# Patient Record
Sex: FEMALE | Race: BLACK OR AFRICAN AMERICAN | NOT HISPANIC OR LATINO | Employment: UNEMPLOYED | URBAN - METROPOLITAN AREA
[De-identification: names, ages, dates, MRNs, and addresses within clinical notes are randomized per-mention and may not be internally consistent; named-entity substitution may affect disease eponyms.]

---

## 2017-03-01 RX ORDER — LISINOPRIL 20 MG/1
TABLET ORAL
Qty: 30 TABLET | Refills: 0 | Status: SHIPPED | OUTPATIENT
Start: 2017-03-01 | End: 2017-05-06 | Stop reason: SDUPTHER

## 2017-05-08 RX ORDER — LISINOPRIL 20 MG/1
TABLET ORAL
Qty: 30 TABLET | Refills: 0 | Status: SHIPPED | OUTPATIENT
Start: 2017-05-08 | End: 2017-11-28 | Stop reason: SDUPTHER

## 2017-06-26 RX ORDER — LISINOPRIL 20 MG/1
TABLET ORAL
Qty: 30 TABLET | Refills: 0 | OUTPATIENT
Start: 2017-06-26

## 2017-07-10 RX ORDER — LISINOPRIL 20 MG/1
TABLET ORAL
Qty: 30 TABLET | Refills: 0 | OUTPATIENT
Start: 2017-07-10

## 2017-11-13 ENCOUNTER — TELEPHONE (OUTPATIENT)
Dept: INTERNAL MEDICINE | Facility: CLINIC | Age: 32
End: 2017-11-13

## 2017-11-13 NOTE — TELEPHONE ENCOUNTER
----- Message from Debra Hermosillo sent at 11/13/2017 10:37 AM CST -----  Contact: Patient  Patient called to speak with the nurse; she would like to come in for a Pneumonia and flu shot. Please call her at [phone].    Thanks,  Debra

## 2017-11-28 ENCOUNTER — OFFICE VISIT (OUTPATIENT)
Dept: INTERNAL MEDICINE | Facility: CLINIC | Age: 32
End: 2017-11-28
Payer: MEDICAID

## 2017-11-28 ENCOUNTER — LAB VISIT (OUTPATIENT)
Dept: LAB | Facility: HOSPITAL | Age: 32
End: 2017-11-28
Attending: FAMILY MEDICINE
Payer: MEDICAID

## 2017-11-28 VITALS
BODY MASS INDEX: 44.41 KG/M2 | OXYGEN SATURATION: 99 % | TEMPERATURE: 98 F | HEIGHT: 68 IN | HEART RATE: 79 BPM | WEIGHT: 293 LBS | SYSTOLIC BLOOD PRESSURE: 135 MMHG | DIASTOLIC BLOOD PRESSURE: 87 MMHG

## 2017-11-28 DIAGNOSIS — E55.9 VITAMIN D DEFICIENCY: ICD-10-CM

## 2017-11-28 DIAGNOSIS — E66.01 MORBID OBESITY WITH BMI OF 50.0-59.9, ADULT: ICD-10-CM

## 2017-11-28 DIAGNOSIS — Z00.00 ROUTINE GENERAL MEDICAL EXAMINATION AT A HEALTH CARE FACILITY: Primary | ICD-10-CM

## 2017-11-28 DIAGNOSIS — I42.9 CARDIOMYOPATHY, UNSPECIFIED TYPE: ICD-10-CM

## 2017-11-28 DIAGNOSIS — J06.9 URTI (ACUTE UPPER RESPIRATORY INFECTION): ICD-10-CM

## 2017-11-28 DIAGNOSIS — I10 ESSENTIAL HYPERTENSION: ICD-10-CM

## 2017-11-28 DIAGNOSIS — Z00.00 ROUTINE GENERAL MEDICAL EXAMINATION AT A HEALTH CARE FACILITY: ICD-10-CM

## 2017-11-28 LAB
25(OH)D3+25(OH)D2 SERPL-MCNC: 12 NG/ML
ALBUMIN SERPL BCP-MCNC: 3.5 G/DL
ALP SERPL-CCNC: 47 U/L
ALT SERPL W/O P-5'-P-CCNC: 13 U/L
ANION GAP SERPL CALC-SCNC: 9 MMOL/L
AST SERPL-CCNC: 17 U/L
BASOPHILS # BLD AUTO: 0.03 K/UL
BASOPHILS NFR BLD: 0.8 %
BILIRUB SERPL-MCNC: 0.8 MG/DL
BUN SERPL-MCNC: 13 MG/DL
CALCIUM SERPL-MCNC: 9 MG/DL
CHLORIDE SERPL-SCNC: 104 MMOL/L
CHOLEST SERPL-MCNC: 208 MG/DL
CHOLEST/HDLC SERPL: 4.8 {RATIO}
CO2 SERPL-SCNC: 25 MMOL/L
CREAT SERPL-MCNC: 1 MG/DL
DIFFERENTIAL METHOD: ABNORMAL
EOSINOPHIL # BLD AUTO: 0.5 K/UL
EOSINOPHIL NFR BLD: 12.7 %
ERYTHROCYTE [DISTWIDTH] IN BLOOD BY AUTOMATED COUNT: 14.2 %
EST. GFR  (AFRICAN AMERICAN): >60 ML/MIN/1.73 M^2
EST. GFR  (NON AFRICAN AMERICAN): >60 ML/MIN/1.73 M^2
ESTIMATED AVG GLUCOSE: 128 MG/DL
GLUCOSE SERPL-MCNC: 84 MG/DL
HBA1C MFR BLD HPLC: 6.1 %
HCT VFR BLD AUTO: 36.9 %
HDLC SERPL-MCNC: 43 MG/DL
HDLC SERPL: 20.7 %
HGB BLD-MCNC: 12.3 G/DL
IMM GRANULOCYTES # BLD AUTO: 0.01 K/UL
IMM GRANULOCYTES NFR BLD AUTO: 0.3 %
LDLC SERPL CALC-MCNC: 143.2 MG/DL
LYMPHOCYTES # BLD AUTO: 1 K/UL
LYMPHOCYTES NFR BLD: 26.2 %
MCH RBC QN AUTO: 28.4 PG
MCHC RBC AUTO-ENTMCNC: 33.3 G/DL
MCV RBC AUTO: 85 FL
MONOCYTES # BLD AUTO: 0.3 K/UL
MONOCYTES NFR BLD: 7 %
NEUTROPHILS # BLD AUTO: 2.1 K/UL
NEUTROPHILS NFR BLD: 53 %
NONHDLC SERPL-MCNC: 165 MG/DL
NRBC BLD-RTO: 0 /100 WBC
PLATELET # BLD AUTO: 210 K/UL
PMV BLD AUTO: 11.7 FL
POTASSIUM SERPL-SCNC: 3.7 MMOL/L
PROT SERPL-MCNC: 8.2 G/DL
RBC # BLD AUTO: 4.33 M/UL
SODIUM SERPL-SCNC: 138 MMOL/L
TRIGL SERPL-MCNC: 109 MG/DL
TSH SERPL DL<=0.005 MIU/L-ACNC: 0.84 UIU/ML
WBC # BLD AUTO: 3.86 K/UL

## 2017-11-28 PROCEDURE — 99999 PR PBB SHADOW E&M-EST. PATIENT-LVL III: CPT | Mod: PBBFAC,,, | Performed by: FAMILY MEDICINE

## 2017-11-28 PROCEDURE — 82306 VITAMIN D 25 HYDROXY: CPT

## 2017-11-28 PROCEDURE — 99395 PREV VISIT EST AGE 18-39: CPT | Mod: 25,S$PBB,, | Performed by: FAMILY MEDICINE

## 2017-11-28 PROCEDURE — 80061 LIPID PANEL: CPT

## 2017-11-28 PROCEDURE — 85025 COMPLETE CBC W/AUTO DIFF WBC: CPT

## 2017-11-28 PROCEDURE — 99213 OFFICE O/P EST LOW 20 MIN: CPT | Mod: PBBFAC,PO,25 | Performed by: FAMILY MEDICINE

## 2017-11-28 PROCEDURE — 90686 IIV4 VACC NO PRSV 0.5 ML IM: CPT | Mod: PBBFAC,PO

## 2017-11-28 PROCEDURE — 80053 COMPREHEN METABOLIC PANEL: CPT

## 2017-11-28 PROCEDURE — 36415 COLL VENOUS BLD VENIPUNCTURE: CPT | Mod: PO

## 2017-11-28 PROCEDURE — 83036 HEMOGLOBIN GLYCOSYLATED A1C: CPT

## 2017-11-28 PROCEDURE — 84443 ASSAY THYROID STIM HORMONE: CPT

## 2017-11-28 RX ORDER — SACUBITRIL AND VALSARTAN 49; 51 MG/1; MG/1
1 TABLET, FILM COATED ORAL 2 TIMES DAILY
COMMUNITY
Start: 2017-11-09

## 2017-11-28 RX ORDER — ASPIRIN 81 MG/1
81 TABLET ORAL
COMMUNITY

## 2017-11-28 RX ORDER — LISINOPRIL 20 MG/1
20 TABLET ORAL
COMMUNITY
End: 2017-11-28

## 2017-11-28 RX ORDER — FUROSEMIDE 40 MG/1
40 TABLET ORAL DAILY
COMMUNITY
Start: 2017-11-08

## 2017-11-28 RX ORDER — CARVEDILOL 12.5 MG/1
12.5 TABLET ORAL 2 TIMES DAILY WITH MEALS
COMMUNITY

## 2017-11-28 NOTE — PROGRESS NOTES
"Subjective:       Patient ID: Carlene Panda is a 32 y.o. female.    Chief Complaint: Annual Exam    32-year-old Afro-American female patient with Patient Active Problem List:     Hypertension     Cardiomyopathy     Morbid obesity with BMI of 50.0-59.9, adult  Here for routine annual physicals.  Patient reports that she's been followed by Dr. Deshpande, cardiology for cardiomyopathy after diagnosed with her delivery.  Patient has been exercising 30 minutes 4-5 days a week.  Denies of any chest pain or shortness of breath, abdominal discomfort.   Patient has been having dry cough with started a few days ago, recently finished antibiotics Zithromax, cough medication and prednisone, felt better few days but started to have dry cough again, did not receive flu shot yet.  Denies of any fever, chills bodyaches.             Review of Systems   Constitutional: Negative for chills, fatigue, fever and unexpected weight change.   HENT: Negative for hearing loss, rhinorrhea and trouble swallowing.    Eyes: Negative for discharge and visual disturbance.   Respiratory: Positive for cough. Negative for chest tightness, shortness of breath and wheezing.    Cardiovascular: Negative for chest pain, palpitations and leg swelling.   Gastrointestinal: Negative for abdominal pain, blood in stool, constipation, diarrhea, nausea and vomiting.   Endocrine: Negative for polydipsia and polyuria.   Genitourinary: Negative for difficulty urinating, dysuria, hematuria and menstrual problem.   Musculoskeletal: Negative for arthralgias, joint swelling, myalgias and neck pain.   Skin: Negative for rash.   Neurological: Negative for weakness, light-headedness and headaches.   Psychiatric/Behavioral: Negative for confusion, dysphoric mood and sleep disturbance.         /87 (BP Location: Right arm, Patient Position: Sitting)   Pulse 79   Temp 98.3 °F (36.8 °C) (Tympanic)   Ht 5' 8" (1.727 m)   Wt (!) 160.6 kg (354 lb 0.9 oz)   LMP 11/14/2017 " (Within Weeks)   SpO2 99%   BMI 53.83 kg/m²   Objective:      Physical Exam   Constitutional: She is oriented to person, place, and time. She appears well-developed and well-nourished.   HENT:   Head: Normocephalic and atraumatic.   Mouth/Throat: Oropharynx is clear and moist.   Cardiovascular: Normal rate, regular rhythm and normal heart sounds.    No murmur heard.  Pulmonary/Chest: Effort normal and breath sounds normal. No respiratory distress. She has no wheezes.   Abdominal: Soft. Bowel sounds are normal. There is no tenderness.   Musculoskeletal: She exhibits no edema.   Neurological: She is alert and oriented to person, place, and time.   Skin: Skin is warm and dry. No rash noted.   Psychiatric: She has a normal mood and affect.         Assessment:       1. Routine general medical examination at a health care facility    2. Essential hypertension    3. Cardiomyopathy, unspecified type    4. Vitamin D deficiency    5. Morbid obesity with BMI of 50.0-59.9, adult    6. URTI (acute upper respiratory infection)        Plan:   Routine general medical examination at a health care facility  -     CBC auto differential; Future; Expected date: 11/28/2017  -     Comprehensive metabolic panel; Future; Expected date: 11/28/2017  -     Lipid panel; Future; Expected date: 11/28/2017  -     TSH; Future; Expected date: 11/28/2017  Vital signs stable today.  Clinical exam stable.  Will check fasting labs today  Patient up-to-date with Pap smear  Flu shot to be given today  Encouraged to work on lifestyle modifications with low-fat and low-cholesterol diet and exercise 30 minutes daily to lose weight with BMI 53  Patient would like to get tetanus vaccine later    Essential hypertension  -     Comprehensive metabolic panel; Future; Expected date: 11/28/2017  -     Lipid panel; Future; Expected date: 11/28/2017  -     TSH; Future; Expected date: 11/28/2017  -     Urinalysis; Future; Expected date: 11/28/2017 blood pressure  stable today currently on Entresto      Cardiomyopathy, unspecified type-followed by cardiologist Dr Deshpande, taking aspirin 81 mg, carvedilol 12.5 mg twice daily, Entresto 49-51 mg twice daily, Lasix 40 mg daily and BiDil twice a day    Vitamin D deficiency  -     Vitamin D; Future; Expected date: 11/28/2017  Will recheck vitamin D levels as she was treated in the past    Morbid obesity with BMI of 50.0-59.9, adult  -     Hemoglobin A1c; Future; Expected date: 11/28/2017  Lifestyle modifications recommended as noted above    URTI (acute upper respiratory infection)-advised to take over-the-counter Mucinex for dry cough and avoid decongestants  Recently finished antibiotics and steroids.  Drink adequate fluids    Other orders  -     Influenza - Quadrivalent (3 years & older) (PF)  Flu shot given today

## 2017-11-29 ENCOUNTER — PATIENT MESSAGE (OUTPATIENT)
Dept: INTERNAL MEDICINE | Facility: CLINIC | Age: 32
End: 2017-11-29

## 2017-11-29 DIAGNOSIS — E78.2 MIXED HYPERLIPIDEMIA: Primary | ICD-10-CM

## 2017-11-29 DIAGNOSIS — E55.9 VITAMIN D DEFICIENCY: Primary | ICD-10-CM

## 2017-11-29 RX ORDER — ATORVASTATIN CALCIUM 20 MG/1
20 TABLET, FILM COATED ORAL DAILY
Qty: 90 TABLET | Refills: 3 | Status: SHIPPED | OUTPATIENT
Start: 2017-11-29 | End: 2019-10-15 | Stop reason: SDUPTHER

## 2017-11-29 RX ORDER — ERGOCALCIFEROL 1.25 MG/1
50000 CAPSULE ORAL
Qty: 10 CAPSULE | Refills: 0 | Status: SHIPPED | OUTPATIENT
Start: 2017-11-29 | End: 2018-06-15 | Stop reason: SDUPTHER

## 2018-05-22 ENCOUNTER — PATIENT OUTREACH (OUTPATIENT)
Dept: ADMINISTRATIVE | Facility: HOSPITAL | Age: 33
End: 2018-05-22

## 2018-06-05 ENCOUNTER — LAB VISIT (OUTPATIENT)
Dept: LAB | Facility: HOSPITAL | Age: 33
End: 2018-06-05
Attending: FAMILY MEDICINE
Payer: MEDICAID

## 2018-06-05 ENCOUNTER — OFFICE VISIT (OUTPATIENT)
Dept: INTERNAL MEDICINE | Facility: CLINIC | Age: 33
End: 2018-06-05
Payer: MEDICAID

## 2018-06-05 VITALS
TEMPERATURE: 97 F | DIASTOLIC BLOOD PRESSURE: 82 MMHG | HEART RATE: 68 BPM | WEIGHT: 293 LBS | HEIGHT: 68 IN | SYSTOLIC BLOOD PRESSURE: 126 MMHG | BODY MASS INDEX: 44.41 KG/M2 | OXYGEN SATURATION: 98 %

## 2018-06-05 DIAGNOSIS — E55.9 VITAMIN D DEFICIENCY: ICD-10-CM

## 2018-06-05 DIAGNOSIS — E66.01 MORBID OBESITY WITH BMI OF 50.0-59.9, ADULT: ICD-10-CM

## 2018-06-05 DIAGNOSIS — E78.2 MIXED HYPERLIPIDEMIA: ICD-10-CM

## 2018-06-05 DIAGNOSIS — I42.9 CARDIOMYOPATHY, UNSPECIFIED TYPE: ICD-10-CM

## 2018-06-05 DIAGNOSIS — I10 ESSENTIAL HYPERTENSION: Primary | ICD-10-CM

## 2018-06-05 DIAGNOSIS — Z23 NEED FOR DIPHTHERIA-TETANUS-PERTUSSIS (TDAP) VACCINE: ICD-10-CM

## 2018-06-05 DIAGNOSIS — I10 ESSENTIAL HYPERTENSION: ICD-10-CM

## 2018-06-05 LAB
25(OH)D3+25(OH)D2 SERPL-MCNC: 14 NG/ML
ALBUMIN SERPL BCP-MCNC: 3.6 G/DL
ALP SERPL-CCNC: 54 U/L
ALT SERPL W/O P-5'-P-CCNC: 13 U/L
ANION GAP SERPL CALC-SCNC: 8 MMOL/L
AST SERPL-CCNC: 11 U/L
BILIRUB SERPL-MCNC: 0.7 MG/DL
BUN SERPL-MCNC: 13 MG/DL
CALCIUM SERPL-MCNC: 9.1 MG/DL
CHLORIDE SERPL-SCNC: 105 MMOL/L
CHOLEST SERPL-MCNC: 159 MG/DL
CHOLEST/HDLC SERPL: 3.7 {RATIO}
CO2 SERPL-SCNC: 29 MMOL/L
CREAT SERPL-MCNC: 0.9 MG/DL
EST. GFR  (AFRICAN AMERICAN): >60 ML/MIN/1.73 M^2
EST. GFR  (NON AFRICAN AMERICAN): >60 ML/MIN/1.73 M^2
ESTIMATED AVG GLUCOSE: 126 MG/DL
GLUCOSE SERPL-MCNC: 86 MG/DL
HBA1C MFR BLD HPLC: 6 %
HDLC SERPL-MCNC: 43 MG/DL
HDLC SERPL: 27 %
LDLC SERPL CALC-MCNC: 104 MG/DL
NONHDLC SERPL-MCNC: 116 MG/DL
POTASSIUM SERPL-SCNC: 3.7 MMOL/L
PROT SERPL-MCNC: 8.2 G/DL
SODIUM SERPL-SCNC: 142 MMOL/L
TRIGL SERPL-MCNC: 60 MG/DL
TSH SERPL DL<=0.005 MIU/L-ACNC: 0.94 UIU/ML

## 2018-06-05 PROCEDURE — 80053 COMPREHEN METABOLIC PANEL: CPT

## 2018-06-05 PROCEDURE — 99999 PR PBB SHADOW E&M-EST. PATIENT-LVL III: CPT | Mod: PBBFAC,,, | Performed by: FAMILY MEDICINE

## 2018-06-05 PROCEDURE — 82306 VITAMIN D 25 HYDROXY: CPT

## 2018-06-05 PROCEDURE — 80061 LIPID PANEL: CPT

## 2018-06-05 PROCEDURE — 84443 ASSAY THYROID STIM HORMONE: CPT

## 2018-06-05 PROCEDURE — 36415 COLL VENOUS BLD VENIPUNCTURE: CPT | Mod: PO

## 2018-06-05 PROCEDURE — 83036 HEMOGLOBIN GLYCOSYLATED A1C: CPT

## 2018-06-05 PROCEDURE — 90471 IMMUNIZATION ADMIN: CPT | Mod: PBBFAC,PO

## 2018-06-05 PROCEDURE — 99213 OFFICE O/P EST LOW 20 MIN: CPT | Mod: PBBFAC,PO | Performed by: FAMILY MEDICINE

## 2018-06-05 PROCEDURE — 99214 OFFICE O/P EST MOD 30 MIN: CPT | Mod: 25,S$PBB,, | Performed by: FAMILY MEDICINE

## 2018-06-05 NOTE — PROGRESS NOTES
"Subjective:       Patient ID: Carlene Panda is a 32 y.o. female.    Chief Complaint: Follow-up    32-year-old  female patient accompanied by her  with Patient Active Problem List:     Hypertension     Cardiomyopathy     Morbid obesity with BMI of 50.0-59.9, adult     Mixed hyperlipidemia  Here for follow-up on chronic medical conditions.  Patient has been followed by her cardiologist  , denies any chest pain or difficulty breathing, leg swelling. Has not been watching her diet and exercising lately.  Taking her cholesterol medication regularly  Does not need any refills on her medications today        Review of Systems   Constitutional: Negative for fatigue.   Eyes: Negative for visual disturbance.   Respiratory: Negative for shortness of breath.    Cardiovascular: Negative for chest pain and leg swelling.   Gastrointestinal: Negative for abdominal pain, nausea and vomiting.   Musculoskeletal: Negative for myalgias.   Skin: Negative for rash.   Neurological: Negative for light-headedness and headaches.   Psychiatric/Behavioral: Negative for sleep disturbance.         /82 (BP Location: Left arm, Patient Position: Sitting)   Pulse 68   Temp 97.4 °F (36.3 °C) (Tympanic)   Ht 5' 8" (1.727 m)   Wt (!) 161.3 kg (355 lb 9.6 oz)   LMP 05/31/2018 (Exact Date)   SpO2 98%   BMI 54.07 kg/m²   Objective:      Physical Exam   Constitutional: She is oriented to person, place, and time. She appears well-developed and well-nourished.   HENT:   Head: Normocephalic and atraumatic.   Mouth/Throat: Oropharynx is clear and moist.   Cardiovascular: Normal rate, regular rhythm and normal heart sounds.    No murmur heard.  Pulmonary/Chest: Effort normal and breath sounds normal. She has no wheezes.   Abdominal: Soft. Bowel sounds are normal. There is no tenderness.   Musculoskeletal: She exhibits no edema.   Neurological: She is alert and oriented to person, place, and time.   Skin: Skin is warm " and dry. No rash noted.   Psychiatric: She has a normal mood and affect.         Assessment:       1. Essential hypertension    2. Cardiomyopathy, unspecified type    3. Mixed hyperlipidemia    4. Morbid obesity with BMI of 50.0-59.9, adult    5. Vitamin D deficiency    6. Need for diphtheria-tetanus-pertussis (Tdap) vaccine        Plan:   Essential hypertension  -     Comprehensive metabolic panel; Future; Expected date: 06/05/2018  -     Lipid panel; Future; Expected date: 06/05/2018  -     TSH; Future; Expected date: 06/05/2018  Blood pressure is stable today   Restrict salt intake and eat low-fat and low-cholesterol diet    Cardiomyopathy, unspecified type-currently on aspirin 81 mg , carvedilol 12.5 mg twice daily , Lasix 40 mg daily , Bidil and Entresto, followed by Cardiology Dr. Deshpande    Mixed hyperlipidemia  -     TSH; Future; Expected date: 06/05/2018  Currently taking Lipitor 20 mg daily  Will check fasting labs as patient is currently fasting    Morbid obesity with BMI of 50.0-59.9, adult  -     Hemoglobin A1c; Future; Expected date: 06/05/2018   Was in prediabetic range on A1c  Continue strict lifestyle changes with diet and exercise to lose weight with BMI 54    Vitamin D deficiency  -     Vitamin D; Future; Expected date: 06/05/2018  Currently taking vitamin-D by prescription weekly    Need for diphtheria-tetanus-pertussis (Tdap) vaccine  -     (In Office Administered) Tdap Vaccine  Tdap given today

## 2018-06-15 DIAGNOSIS — E55.9 VITAMIN D DEFICIENCY: ICD-10-CM

## 2018-06-15 RX ORDER — ERGOCALCIFEROL 1.25 MG/1
50000 CAPSULE ORAL
Qty: 10 CAPSULE | Refills: 0 | Status: SHIPPED | OUTPATIENT
Start: 2018-06-15 | End: 2018-12-11 | Stop reason: SDUPTHER

## 2018-07-25 ENCOUNTER — TELEPHONE (OUTPATIENT)
Dept: INTERNAL MEDICINE | Facility: CLINIC | Age: 33
End: 2018-07-25

## 2018-07-25 NOTE — TELEPHONE ENCOUNTER
----- Message from Tila Bryant sent at 7/25/2018  2:14 PM CDT -----  Contact: Pt   Pt called to schedule her annual physical and pap smear no times came up..[phone] (home) [phone] (work)

## 2018-08-01 ENCOUNTER — PATIENT OUTREACH (OUTPATIENT)
Dept: ADMINISTRATIVE | Facility: HOSPITAL | Age: 33
End: 2018-08-01

## 2018-08-10 ENCOUNTER — OFFICE VISIT (OUTPATIENT)
Dept: INTERNAL MEDICINE | Facility: CLINIC | Age: 33
End: 2018-08-10
Payer: MEDICAID

## 2018-08-10 VITALS
HEIGHT: 68 IN | SYSTOLIC BLOOD PRESSURE: 136 MMHG | DIASTOLIC BLOOD PRESSURE: 104 MMHG | HEART RATE: 66 BPM | OXYGEN SATURATION: 99 % | TEMPERATURE: 98 F | WEIGHT: 293 LBS | BODY MASS INDEX: 44.41 KG/M2

## 2018-08-10 DIAGNOSIS — Z01.419 WELL WOMAN EXAM: Primary | ICD-10-CM

## 2018-08-10 LAB
BACTERIA GENITAL QL WET PREP: ABNORMAL
CLUE CELLS VAG QL WET PREP: ABNORMAL
FILAMENT FUNGI VAG WET PREP-#/AREA: ABNORMAL
SPECIMEN SOURCE: ABNORMAL
T VAGINALIS GENITAL QL WET PREP: ABNORMAL
WBC #/AREA VAG WET PREP: ABNORMAL
YEAST GENITAL QL WET PREP: ABNORMAL

## 2018-08-10 PROCEDURE — 99214 OFFICE O/P EST MOD 30 MIN: CPT | Mod: PBBFAC,PO | Performed by: NURSE PRACTITIONER

## 2018-08-10 PROCEDURE — 87210 SMEAR WET MOUNT SALINE/INK: CPT | Mod: PO

## 2018-08-10 PROCEDURE — 88175 CYTOPATH C/V AUTO FLUID REDO: CPT

## 2018-08-10 PROCEDURE — 99999 PR PBB SHADOW E&M-EST. PATIENT-LVL IV: CPT | Mod: PBBFAC,,, | Performed by: NURSE PRACTITIONER

## 2018-08-10 PROCEDURE — 99395 PREV VISIT EST AGE 18-39: CPT | Mod: S$PBB,,, | Performed by: NURSE PRACTITIONER

## 2018-08-10 RX ORDER — METRONIDAZOLE 500 MG/1
500 TABLET ORAL EVERY 12 HOURS
Qty: 14 TABLET | Refills: 0 | Status: SHIPPED | OUTPATIENT
Start: 2018-08-10 | End: 2018-08-17

## 2018-08-10 NOTE — PROGRESS NOTES
Subjective:       Patient ID: Carlene Panda is a 33 y.o. female.    Chief Complaint: Gynecologic Exam    Patient presents for well woman's exam.  Denies any complaints.  Reports last PAP 3 years ago.  Denies any abnormal pap reports.  Not currently sexually active.        Gynecologic Exam   The patient's primary symptoms include vaginal discharge. The patient's pertinent negatives include no genital itching. This is a new problem. The patient is experiencing no pain. Pertinent negatives include no constipation, diarrhea, dysuria, headaches, hematuria or vomiting. The vaginal discharge was milky. She has not been passing clots. She is not sexually active.     Review of Systems   Constitutional: Negative for activity change and unexpected weight change.   HENT: Negative for hearing loss, rhinorrhea and trouble swallowing.    Eyes: Negative for discharge and visual disturbance.   Respiratory: Negative for chest tightness and wheezing.    Cardiovascular: Negative for chest pain and palpitations.   Gastrointestinal: Negative for blood in stool, constipation, diarrhea and vomiting.   Endocrine: Negative for polydipsia and polyuria.   Genitourinary: Positive for vaginal discharge. Negative for difficulty urinating, dysuria, hematuria and menstrual problem.   Musculoskeletal: Negative for arthralgias, joint swelling and neck pain.   Neurological: Negative for weakness and headaches.   Psychiatric/Behavioral: Negative for confusion and dysphoric mood.       Objective:      Physical Exam   Constitutional: She appears well-developed and well-nourished.   HENT:   Head: Normocephalic and atraumatic.   Neck: Normal range of motion.   Cardiovascular: Normal rate and regular rhythm.    Pulmonary/Chest: Effort normal and breath sounds normal. Right breast exhibits no inverted nipple, no mass and no nipple discharge. Left breast exhibits no inverted nipple, no mass and no nipple discharge.   Genitourinary: Cervix exhibits no  friability. Right adnexum displays no tenderness. Left adnexum displays no tenderness. Vaginal discharge found.   Musculoskeletal: Normal range of motion.   Skin: Skin is warm.   Psychiatric: She has a normal mood and affect. Her behavior is normal.       Assessment:       1. Well woman exam        Plan:         Well woman exam  -     Liquid-based pap smear, screening  -     Vaginal Screen Vagina; Future; Expected date: 08/10/2018    Other orders  -     metroNIDAZOLE (FLAGYL) 500 MG tablet; Take 1 tablet (500 mg total) by mouth every 12 (twelve) hours. for 7 days  Dispense: 14 tablet; Refill: 0        Will notify of lab reports.

## 2018-12-11 DIAGNOSIS — E55.9 VITAMIN D DEFICIENCY: ICD-10-CM

## 2018-12-11 RX ORDER — ERGOCALCIFEROL 1.25 MG/1
50000 CAPSULE ORAL
Qty: 10 CAPSULE | Refills: 0 | Status: SHIPPED | OUTPATIENT
Start: 2018-12-11 | End: 2019-11-08

## 2018-12-20 ENCOUNTER — HOSPITAL ENCOUNTER (OUTPATIENT)
Dept: RADIOLOGY | Facility: HOSPITAL | Age: 33
Discharge: HOME | End: 2018-12-20
Attending: PHYSICIAN ASSISTANT
Payer: MEDICAID

## 2018-12-20 ENCOUNTER — OFFICE VISIT (OUTPATIENT)
Dept: INTERNAL MEDICINE | Facility: CLINIC | Age: 33
End: 2018-12-20
Payer: MEDICAID

## 2018-12-20 VITALS
SYSTOLIC BLOOD PRESSURE: 156 MMHG | HEIGHT: 68 IN | BODY MASS INDEX: 44.41 KG/M2 | HEART RATE: 80 BPM | WEIGHT: 293 LBS | TEMPERATURE: 98 F | OXYGEN SATURATION: 99 % | DIASTOLIC BLOOD PRESSURE: 98 MMHG

## 2018-12-20 DIAGNOSIS — E78.2 MIXED HYPERLIPIDEMIA: ICD-10-CM

## 2018-12-20 DIAGNOSIS — M54.50 CHRONIC BILATERAL LOW BACK PAIN WITHOUT SCIATICA: ICD-10-CM

## 2018-12-20 DIAGNOSIS — I10 ESSENTIAL HYPERTENSION: ICD-10-CM

## 2018-12-20 DIAGNOSIS — G89.29 CHRONIC BILATERAL LOW BACK PAIN WITHOUT SCIATICA: ICD-10-CM

## 2018-12-20 DIAGNOSIS — G89.29 CHRONIC PAIN OF LEFT KNEE: Primary | ICD-10-CM

## 2018-12-20 DIAGNOSIS — M25.562 CHRONIC PAIN OF LEFT KNEE: Primary | ICD-10-CM

## 2018-12-20 DIAGNOSIS — E66.01 MORBID OBESITY WITH BMI OF 50.0-59.9, ADULT: ICD-10-CM

## 2018-12-20 DIAGNOSIS — G89.29 CHRONIC PAIN OF LEFT KNEE: ICD-10-CM

## 2018-12-20 DIAGNOSIS — M25.562 CHRONIC PAIN OF LEFT KNEE: ICD-10-CM

## 2018-12-20 PROCEDURE — 73562 X-RAY EXAM OF KNEE 3: CPT | Mod: 26,LT,, | Performed by: RADIOLOGY

## 2018-12-20 PROCEDURE — 99214 OFFICE O/P EST MOD 30 MIN: CPT | Mod: PBBFAC,PO,25 | Performed by: PHYSICIAN ASSISTANT

## 2018-12-20 PROCEDURE — 73560 X-RAY EXAM OF KNEE 1 OR 2: CPT | Mod: 26,XS,RT, | Performed by: RADIOLOGY

## 2018-12-20 PROCEDURE — 73562 X-RAY EXAM OF KNEE 3: CPT | Mod: TC,FY,PO,LT

## 2018-12-20 PROCEDURE — 99214 OFFICE O/P EST MOD 30 MIN: CPT | Mod: S$PBB,,, | Performed by: PHYSICIAN ASSISTANT

## 2018-12-20 PROCEDURE — 72100 X-RAY EXAM L-S SPINE 2/3 VWS: CPT | Mod: TC,FY,PO

## 2018-12-20 PROCEDURE — 72100 X-RAY EXAM L-S SPINE 2/3 VWS: CPT | Mod: 26,,, | Performed by: RADIOLOGY

## 2018-12-20 PROCEDURE — 99999 PR PBB SHADOW E&M-EST. PATIENT-LVL IV: CPT | Mod: PBBFAC,,, | Performed by: PHYSICIAN ASSISTANT

## 2018-12-20 PROCEDURE — 73560 X-RAY EXAM OF KNEE 1 OR 2: CPT | Mod: TC,FY,PO,RT,59

## 2018-12-20 PROCEDURE — 90686 IIV4 VACC NO PRSV 0.5 ML IM: CPT | Mod: PBBFAC,PO

## 2018-12-20 RX ORDER — DICLOFENAC SODIUM 75 MG/1
75 TABLET, DELAYED RELEASE ORAL
COMMUNITY
Start: 2018-12-13 | End: 2019-11-08

## 2018-12-20 RX ORDER — METHYLPREDNISOLONE 4 MG/1
TABLET ORAL
COMMUNITY
Start: 2018-12-13 | End: 2019-01-29

## 2018-12-20 NOTE — PATIENT INSTRUCTIONS
Knee Pain  Knee pain is very common. Its especially common in active people who put a lot of pressure on their knees, like runners. It affects women more often than men.  Your kneecap (patella) is a thick, round bone. It covers and protects the front portion of your knee joint. It moves along a groove in your thighbone (femur) as part of the patellofemoral joint. A layer of cartilage surrounds the underside of your kneecap. This layer protects it from grinding against your femur.  When this cartilage softens and breaks down, it can cause knee pain. This is partly because of repetitive stress. The stress irritates the lining of the joint. This causes pain in the underlying bone.  What causes knee pain?  Many things can cause knee pain. You may have more than one cause. Some of these include:  · Overuse of the knee joint  · The kneecap doesnt line up with the tissue around it  · Damage to small nerves in the area  · Damage to the ligament-like structure that holds the kneecap in place (retinaculum)  · Breakdown of the bone under the cartilage  · Swelling in the soft tissues around the kneecap  · Injury  You might be more likely to have knee pain if you:  · Exercise a lot  · Recently increased the intensity of your workouts  · Have a body mass index (BMI) greater than 25  · Have poor alignment of your kneecap  · Walk with your feet turned overly outward or inward  · Have weakness in surrounding muscle groups (inner quad or hip adductor muscles)  · Have too much tightness in surrounding muscle groups (hamstrings or iliotibial band)  · Have a recent history of injury to the area  · Are female  Symptoms of knee pain  This type of knee pain is a dull, aching pain in the front of the knee in the area under and around the kneecap. This pain may start quickly or slowly. Your pain might be worse when you squat, run, or sit for a long time. You might also sometimes feel like your knee is giving out. You may have symptoms in  one or both of your knees.  Diagnosing knee pain  Your healthcare provider will ask about your medical history and your symptoms. Be sure to describe any activities that make your knee pain worse. He or she will look at your knee. This will include tests of your range of motion, strength, and areas of pain of your knee. Your knee alignment will be checked.  Your healthcare provider will need to rule out other causes of your knee pain, such as arthritis. You may need an imaging test, such as an X-ray or MRI.  Treatment for knee pain  Treatments that can help ease your symptoms may include:  · Avoiding activities for a while that make your pain worse, returning to activity over time  · Icing the outside of your knee when it causes you pain  · Taking over-the-counter pain medicine  · Wearing a knee brace or taping your knee to support it  · Wearing special shoe inserts to help keep your feet in the proper alignment  · Doing special exercises to stretch and strengthen the muscles around your hip and your knee  These steps help most people manage knee pain. But some cases of knee pain need to be treated with surgery. You may need surgery right away. Or you may need it later if other treatments dont work. Your healthcare provider may refer you to an orthopedic surgeon. He or she will talk with you about your choices.  Preventing knee pain  Losing weight and correcting excess muscle tightness or muscle weakness may help lower your risk.  In some cases, you can prevent knee pain. To help prevent a flare-up of knee pain, you do these things:  · Regularly do all the exercises your doctor or physical therapist advises  · Support your knee as advised by your doctor or physical therapist  · Increase training gradually, and ease up on training when needed  · Have an expert check your gait for running or other sporting activities  · Stretch properly before and after exercise  · Replace your running shoes regularly  · Lose excess  weight     When to call your healthcare provider  Call your healthcare provider right away if:  · Your symptoms dont get better after a few weeks of treatment  · You have any new symptoms   Date Last Reviewed: 4/1/2017 © 2000-2017 [employer]. [address] [place] [address], [place] [address]. All rights reserved. This information is not intended as a substitute for professional medical care. Always follow your healthcare professional's instructions.        Understanding Osteoarthritis of the Knee    A joint is a place where two bones meet. The knee is called a hinge joint. This joint is formed where the thighbone (femur) meets the shinbone (tibia). A healthy knee joint bends freely. Knee osteoarthritis is a condition where parts of the knee joint wear out. This can lead to pain, stiffness, and limited movement.   What is osteoarthritis?  Every joint contains a smooth tissue called cartilage. Cartilage cushions the ends of bones and helps bones in a joint glide smoothly against each other. Knee osteoarthritis occurs when cartilage in the knee joint begins to break down and wear away. Bones may become exposed and rub together. The cartilage may become irritated and rough. This prevents smooth movement of the joint and can lead to pain.  Causes of osteoarthritis of the knee  Causes can include:  · Wear and tear from normal use over time  · Overuse of the knee during sports or work activities  · Being overweight. This increases stress on the knee joint.  · Misalignment of the knee joint  · Injury to the knee  Symptoms of osteoarthritis of the knee  Common symptoms include:  · Pain and swelling around the joint. The pain and swelling get worse with activity and better with rest.  · Grinding sound when moving the knee  · Reduced knee movement  · Knee stiffness. This is often worse first thing in the morning.  Treating osteoarthritis of the knee  Osteoarthritis is a long-term condition. Treatment usually focuses  on managing symptoms. Treatment may include:  · Over-the-counter or prescription medicines taken by mouth to help relieve pain and swelling  · Injections of medicine into the joint to help relieve symptoms for a time  · A weight-loss plan for people who are overweight  · A plan of physical therapy and exercises to improve the strength and flexibility of the muscles around the knee  · Heat or cold therapy to help relieve pain and stiffness  · Assistive devices that help with movement, such as a cane or a walker  · Assistive devices that make activities of daily life easier, such as raised toilet seats or shower bars  If other treatments dont do enough to relieve symptoms, you may need surgery to replace the joint. During this surgery, the damaged joint is removed. An artificial knee joint is then put into place. This can help relieve pain and stiffness and restore movement of the knee.     When to call your healthcare provider  Call your healthcare provider right away if you have any of these:  · Fever of 100.4°F (38°C) or higher, or as directed  · Symptoms that dont get better with prescribed medicines or get worse  · New symptoms   Date Last Reviewed: 3/10/2016  © 4996-3733 [employer]. [address] [place] [address]. All rights reserved. This information is not intended as a substitute for professional medical care. Always follow your healthcare professional's instructions.        Back Care Tips    Caring for your back  These are things you can do to prevent a recurrence of acute back pain and to reduce symptoms from chronic back pain:  · Maintain a healthy weight. If you are overweight, losing weight will help most types of back pain.  · Exercise is an important part of recovery from most types of back pain. The muscles behind and in front of the spine support the back. This means strengthening both the back muscles and the abdominal muscles will provide better support for your  spine.   · Swimming and brisk walking are good overall exercises to improve your fitness level.  · Practice safe lifting methods (below).  · Practice good posture when sitting, standing and walking. Avoid prolonged sitting. This puts more stress on the lower back than standing or walking.  · Wear quality shoes with sufficient arch support. Foot and ankle alignment can affect back symptoms. Women should avoid wearing high heels.  · Therapeutic massage can help relax the back muscles without stretching them.  · During the first 24 to 72 hours after an acute injury or flare-up of chronic back pain, apply an ice pack to the painful area for 20 minutes and then remove it for 20 minutes, over a period of 60 to 90 minutes, or several times a day. As a safety precaution, do not use a heating pad at bedtime. Sleeping on a heating pad can lead to skin burns or tissue damage.  · You can alternate ice and heat therapies.  Medications  Talk to your healthcare provider before using medicines, especially if you have other medical problems or are taking other medicines.  · You may use acetaminophen or ibuprofen to control pain, unless your healthcare provider prescribed other pain medicine. If you have chronic conditions like diabetes, liver or kidney disease, stomach ulcers, or gastrointestinal bleeding, or are taking blood thinners, talk with your healthcare provider before taking any medicines.  · Be careful if you are given prescription pain medicines, narcotics, or medicine for muscle spasm. They can cause drowsiness, affect your coordination, reflexes, and judgment. Do not drive or operate heavy machinery while taking these types of medicines. Take prescription pain medicine only as prescribed by your healthcare provider.  Lumbar stretch  Here is a simple stretching exercise that will help relax muscle spasm and keep your back more limber. If exercise makes your back pain worse, dont do it.  · Lie on your back with your  knees bent and both feet on the ground.  · Slowly raise your left knee to your chest as you flatten your lower back against the floor. Hold for 5 seconds.  · Relax and repeat the exercise with your right knee.  · Do 10 of these exercises for each leg.  Safe lifting method  · Dont bend over at the waist to lift an object off the floor.  Instead, bend your knees and hips in a squat.   · Keep your back and head upright  · Hold the object close to your body, directly in front of you.  · Straighten your legs to lift the object.   · Lower the object to the floor in the reverse fashion.  · If you must slide something across the floor, push it.  Posture tips  Sitting  Sit in chairs with straight backs or low-back support. Keep your knees lower than your hips, with your feet flat on the floor.  When driving, sit up straight. Adjust the seat forward so you are not leaning toward the steering wheel.  A small pillow or rolled towel behind your lower back may help if you are driving long distances.   Standing  When standing for long periods, shift most of your weight to one leg at a time. Alternate legs every few minutes.   Sleeping  The best way to sleep is on your side with your knees bent. Put a low pillow under your head to support your neck in a neutral spine position. Avoid thick pillows that bend your neck to one side. Put a pillow between your legs to further relax your lower back. If you sleep on your back, put pillows under your knees to support your legs in a slightly flexed position. Use a firm mattress. If your mattress sags, replace it, or use a 1/2-inch plywood board under the mattress to add support.  Follow-up care  Follow up with your healthcare provider, or as advised.  If X-rays, a CT scan or an MRI scan were taken, they will be reviewed by a radiologist. You will be notified of any new findings that may affect your care.  Call 911  Seek emergency medical care if any of the following occur:  · Trouble  breathing  · Confusion  · Very drowsy  · Fainting or loss of consciousness  · Rapid or very slow heart rate  · Loss of  bowel or bladder control  When to seek medical care  Call your healthcare provider if any of the following occur:  · Pain becomes worse or spreads to your arms or legs  · Weakness or numbness in one or both arms or legs  · Numbness in the groin area  Date Last Reviewed: 6/1/2016  © 3345-3332 [employer]. [address] [place] [address], [place] [address]. All rights reserved. This information is not intended as a substitute for professional medical care. Always follow your healthcare professional's instructions.        Exercises to Strengthen Your Lower Back  Strong lower back and abdominal muscles work together to support your spine. The exercises below will help strengthen the lower back. It is important that you begin exercising slowly and increase levels gradually.  Always begin any exercise program with stretching. If you feel pain while doing any of these exercises, stop and talk to your doctor about a more specific exercise program that better suits your condition.   Low back stretch  The point of stretching is to make you more flexible and increase your range of motion. Stretch only as much as you are able. Stretch slowly. Do not push your stretch to the limit. If at any point you feel pain while stretching, this is your (temporary) limit.  · Lie on your back with your knees bent and both feet on the ground.  · Slowly raise your left knee to your chest as you flatten your lower back against the floor. Hold for 5 seconds.  · Relax and repeat the exercise with your right knee.  · Do 10 of these exercises for each leg.  · Repeat hugging both knees to your chest at the same time.  Building lower back strength  Start your exercise routine with 10 to 30 minutes a day, 1 to 3 times a day.  Initial exercises  Lying on your back:  1. Ankle pumps: Move your foot up and down, towards your head,  and then away. Repeat 10 times with each foot.  2. Heel slides: Slowly bend your knee, drawing the heel of your foot towards you. Then slide your heel/foot from you, straightening your knee. Do not lift your foot off the floor (this is not a leg lift).  3. Abdominal contraction: Bend your knees and put your hands on your stomach. Tighten your stomach muscles. Hold for 5 seconds, then relax. Repeat 10 times.  4. Straight leg raise: Bend one leg at the knee and keep the other leg straight. Tighten your stomach muscles. Slowly lift your straight leg 6 to 12 inches off the floor and hold for up to 5 seconds. Repeat 10 times on each side.  Standin. Wall squats: Stand with your back against the wall. Move your feet about 12 inches away from the wall. Tighten your stomach muscles, and slowly bend your knees until they are at about a 45 degree angle. Do not go down too far. Hold about 5 seconds. Then slowly return to your starting position. Repeat 10 times.  2. Heel raises: Stand facing the wall. Slowly raise the heels of your feet up and down, while keeping your toes on the floor. If you have trouble balancing, you can touch the wall with your hands. Repeat 10 times.  More advanced exercises  When you feel comfortable enough, try these exercises.  1. Kneeling lumbar extension: Begin on your hands and knees. At the same time, raise and straighten your right arm and left leg until they are parallel to the ground. Hold for 2 seconds and come back slowly to a starting position. Repeat with left arm and right leg, alternating 10 times.  2. Prone lumbar extension: Lie face down, arms extended overhead, palms on the floor. At the same time, raise your right arm and left leg as high as comfortably possible. Hold for 10 seconds and slowly return to start. Repeat with left arm and right leg, alternating 10 times. Gradually build up to 20 times. (Advanced: Repeat this exercise raising both arms and both legs a few inches off the  floor at the same time. Hold for 5 seconds and release.)  3. Pelvic tilt: Lie on the floor on your back with your knees bent at 90 degrees. Your feet should be flat on the floor. Inhale, exhale, then slowly contract your abdominal muscles bringing your navel toward your spine. Let your pelvis rock back until your lower back is flat on the floor. Hold for 10 seconds while breathing smoothly.  4. Abdominal crunch: Perform a pelvic tilt (above) flattening your lower back against the floor. Holding the tension in your abdominal muscles, take another breath and raise your shoulder blades off the ground (this is not a full sit-up). Keep your head in line with your body (dont bend your neck forward). Hold for 2 seconds, then slowly lower.  Date Last Reviewed: 6/1/2016 © 2000-2017 [employer]. [address] [place], [place] [id number]. All rights reserved. This information is not intended as a substitute for professional medical care. Always follow your healthcare professional's instructions.        Back Pain (Acute or Chronic)    Back pain is one of the most common problems. The good news is that most people feel better in 1 to 2 weeks, and most of the rest in 1 to 2 months. Most people can remain active.  People experience and describe pain differently; not everyone is the same.  · The pain can be sharp, stabbing, shooting, aching, cramping or burning.  · Movement, standing, bending, lifting, sitting, or walking may worsen pain.  · It can be localized to one spot or area, or it can be more generalized.  · It can spread or radiate upwards, to the front, or go down your arms or legs (sciatica).  · It can cause muscle spasm.  Most of the time, mechanical problems with the muscles or spine cause the pain. Mechanical problems are usually caused by an injury to the muscles or ligaments. While illness can cause back pain, it is usually not caused by a serious illness. Mechanical problems include:   · Physical  activity such as sports, exercise, work, or normal activity  · Overexertion, lifting, pushing, pulling incorrectly or too aggressively  · Sudden twisting, bending, or stretching from an accident, or accidental movement  · Poor posture  · Stretching or moving wrong, without noticing pain at the time  · Poor coordination, lack of regular exercise (check with your doctor about this)  · Spinal disc disease or arthritis  · Stress  Pain can also be related to pregnancy, or illness like appendicitis, bladder or kidney infections, pelvic infections, and many other things.  Acute back pain usually gets better in 1 to 2 weeks. Back pain related to disk disease, arthritis in the spinal joints or spinal stenosis (narrowing of the spinal canal) can become chronic and last for months or years.  Unless you had a physical injury (for example, a car accident or fall) X-rays are usually not needed for the initial evaluation of back pain. If pain continues and does not respond to medical treatment, X-rays and other tests may be needed.  Home care  Try these home care recommendations:  · When in bed, try to find a position of comfort. A firm mattress is best. Try lying flat on your back with pillows under your knees. You can also try lying on your side with your knees bent up towards your chest and a pillow between your knees.  · At first, do not try to stretch out the sore spots. If there is a strain, it is not like the good soreness you get after exercising without an injury. In this case, stretching may make it worse.  · Avoid prolong sitting, long car rides, or travel. This puts more stress on the lower back than standing or walking.  · During the first 24 to 72 hours after an acute injury or flare up of chronic back pain, apply an ice pack to the painful area for 20 minutes and then remove it for 20 minutes. Do this over a period of 60 to 90 minutes or several times a day. This will reduce swelling and pain. Wrap the ice pack in a  thin towel or plastic to protect your skin.  · You can start with ice, then switch to heat. Heat (hot shower, hot bath, or heating pad) reduces pain and works well for muscle spasms. Heat can be applied to the painful area for 20 minutes then remove it for 20 minutes. Do this over a period of 60 to 90 minutes or several times a day. Do not sleep on a heating pad. It can lead to skin burns or tissue damage.  · You can alternate ice and heat therapy. Talk with your doctor about the best treatment for your back pain.  · Therapeutic massage can help relax the back muscles without stretching them.  · Be aware of safe lifting methods and do not lift anything without stretching first.  Medicines  Talk to your doctor before using medicine, especially if you have other medical problems or are taking other medicines.  · You may use over-the-counter medicine as directed on the bottle to control pain, unless another pain medicine was prescribed. If you have chronic conditions like diabetes, liver or kidney disease, stomach ulcers, or gastrointestinal bleeding, or are taking blood thinners, talk to your doctor before taking any medicine.  · Be careful if you are given a prescription medicines, narcotics, or medicine for muscle spasms. They can cause drowsiness, affect your coordination, reflexes, and judgement. Do not drive or operate heavy machinery.  Follow-up care  Follow up with your healthcare provider, or as advised.   A radiologist will review any X-rays that were taken. Your provide will notify you of any new findings that may affect your care.  Call 911  Call emergency services if any of the following occur:  · Trouble breathing  · Confusion  · Very drowsy or trouble awakening  · Fainting or loss of consciousness  · Rapid or very slow heart rate  · Loss of bowel or bladder control  When to seek medical advice  Call your healthcare provider right away if any of these occur:   · Pain becomes worse or spreads to your  legs  · Weakness or numbness in one or both legs  · Numbness in the groin or genital area  Date Last Reviewed: 7/1/2016  © 5596-3353 [employer]. [address] [place] [address], [place] [address]. All rights reserved. This information is not intended as a substitute for professional medical care. Always follow your healthcare professional's instructions.        Self-Care for Low Back Pain    Most people have low back pain now and then. In many cases, it isnt serious and self-care can help. Sometimes low back pain can be a sign of a bigger problem. Call your healthcare provider if your pain returns often or gets worse over time. For the long-term care of your back, get regular exercise, lose any excess weight and learn good posture.  Take a short rest  Lying down during the day may be beneficial for short periods of time if severe pain increases with sitting or standing. Long-term bed rest could be detrimental.  Reduce pain and swelling  Cold reduces swelling. Both cold and heat can reduce pain. Protect your skin by placing a towel between your body and the ice or heat source.  · For the first few days, apply an ice pack for 15 to 20 minutes .  · After the first few days, try heat for 15 minutes at a time to ease pain. Never sleep on a heating pad.  · Over-the-counter medicine can help control pain and swelling. Try aspirin or ibuprofen.  Exercise  Exercise can help your back heal. It also helps your back get stronger and more flexible, preventing any reinjury. Ask your healthcare provider about specific exercises for your back.  Use good posture to avoid reinjury  · When moving, bend at the hips and knees. Dont bend at the waist or twist around.  · When lifting, keep the object close to your body. Dont try to lift more than you can handle.  · When sitting, keep your lower back supported. Use a rolled-up towel as needed.  Seek immediate medical care if:  · Youre unable to stand or walk.  · You have a  temperature over 100.4°F (38.0°C)  · You have frequent, painful, or bloody urination.  · You have severe abdominal pain.  · You have a sharp, stabbing pain.  · Your pain is constant.  · You have pain or numbness in your leg.  · You feel pain in a new area of your back.  · You notice that the pain isnt decreasing after more than a week.   Date Last Reviewed: 9/29/2015  © 2506-4416 [employer]. [address] [place] [address]. All rights reserved. This information is not intended as a substitute for professional medical care. Always follow your healthcare professional's instructions.        Causes of Lumbar (Low Back) Pain  Low back pain can be caused by problems with any part of the lumbar spine. A disk can herniate (push out) and press on a nerve. Vertebrae can rub against each other or slip out of place. This can irritate facet joints and nerves. It can also lead to stenosis, a narrowing of the spinal canal or foramen.  Pressure from a disk  Constant wear and tear on a disk can cause it to weaken and push outward. Part of the disk may then press on nearby nerves. There are two common types of herniated disks:  Contained means the soft nucleus is protruding outward.   Extruded means the firm annulus has torn, letting the soft center squeeze through.     Pressure from bone  An unstable spine   With age, a disk may thin and wear out. Vertebrae above and below the disk may begin to touch. This can put pressure on nerves. It can also cause bone spurs (growths) to form where the bones rub together.    Stenosis results when bone spurs narrow the foramen or spinal canal. This also puts pressure on nerves. Slipping vertebrae can irritate nerves and joints. They can also worsen stenosis.    In some cases, vertebrae become unstable and slip forward. This is called spondylolisthesis.     Date Last Reviewed: 10/12/2015  © 1844-2734 [employer]. [address] [place] [address].  All rights reserved. This information is not intended as a substitute for professional medical care. Always follow your healthcare professional's instructions.        Relieving Back Pain  Back pain is a common problem. You can strain back muscles by lifting too much weight or just by moving the wrong way. Back strain can be uncomfortable, even painful. And it can take weeks or months to improve. To help yourself feel better and prevent future back strains, try these tips.  Important Note: Do not give aspirin to children or teens without first discussing it with your healthcare provider.      ? Ice    Ice reduces muscle pain and swelling. It helps most during the first 24 to 48 hours after an injury.  · Wrap an ice pack or a bag of frozen peas in a thin towel. (Never place ice directly on your skin.)  · Place the ice where your back hurts the most.  · Dont ice for more than 20 minutes at a time.  · You can use ice several times a day.  ? Medicines  Over-the-counter pain relievers can include acetaminophen and anti-inflammatory medicines, which includes aspirin or ibuprofen. They can help ease discomfort. Some also reduce swelling.  · Tell your healthcare provider about any medicines you are already taking.  · Take medicines only as directed.  ? Heat  After the first 48 hours, heat can relax sore muscles and improve blood flow.  · Try a warm bath or shower. Or use a heating pad set on low. To prevent a burn, keep a cloth between you and the heating pad.  · Dont use a heating pad for more than 15 minutes at a time. Never sleep on a heating pad.  Date Last Reviewed: 9/1/2015  © 2873-7774 [employer]. [address] [place] [address], [place] [address]. All rights reserved. This information is not intended as a substitute for professional medical care. Always follow your healthcare professional's instructions.

## 2018-12-20 NOTE — PROGRESS NOTES
Subjective:      Patient ID: Carlene Panda is a 33 y.o. female.    Chief Complaint: Follow-up (6mth rtc); Knee Pain (Left knee); and Back Pain    Knee Pain    Incident onset: 1 month ago. There was no injury mechanism. The pain is present in the left knee. The quality of the pain is described as stabbing. Pain scale: no pain currently, can get up to 7-8 when going up or down stairs. The pain is moderate. The pain has been fluctuating since onset. Associated symptoms include muscle weakness. Pertinent negatives include no inability to bear weight, loss of motion, loss of sensation, numbness or tingling. She reports no foreign bodies present. Exacerbated by: going up and down stairs. She has tried nothing for the symptoms.   Back Pain   This is a new problem. Episode onset: 2 years. The problem occurs intermittently. The problem has been gradually worsening since onset. The pain is present in the gluteal. The quality of the pain is described as shooting. The pain does not radiate. The pain is at a severity of 9/10. The pain is severe. The pain is the same all the time. The symptoms are aggravated by sitting (going from sitting to standing). Pertinent negatives include no abdominal pain, bladder incontinence, bowel incontinence, chest pain, dysuria, fever, headaches, leg pain, numbness, paresis, paresthesias, pelvic pain, perianal numbness, tingling, weakness or weight loss. Risk factors include obesity and sedentary lifestyle (minor back trauma 15 years ago, did not have xrays). She has tried nothing for the symptoms. The treatment provided no relief.   Blood pressure is elevated today because she did not take her blood pressure medication today.     Review of Systems   Constitutional: Negative for activity change, appetite change, chills, diaphoresis, fatigue, fever, unexpected weight change and weight loss.   HENT: Negative.  Negative for congestion, hearing loss, postnasal drip, rhinorrhea, sore throat, trouble  "swallowing and voice change.    Eyes: Negative.  Negative for visual disturbance.   Respiratory: Negative.  Negative for cough, choking, chest tightness and shortness of breath.    Cardiovascular: Negative for chest pain, palpitations and leg swelling.   Gastrointestinal: Negative for abdominal distention, abdominal pain, blood in stool, bowel incontinence, constipation, diarrhea, nausea and vomiting.   Endocrine: Negative for cold intolerance, heat intolerance, polydipsia and polyuria.   Genitourinary: Negative.  Negative for bladder incontinence, difficulty urinating, dysuria, frequency and pelvic pain.   Musculoskeletal: Positive for arthralgias and back pain. Negative for gait problem, joint swelling, myalgias, neck pain and neck stiffness.   Skin: Negative for color change, pallor, rash and wound.   Neurological: Negative for dizziness, tingling, tremors, weakness, light-headedness, numbness, headaches and paresthesias.   Hematological: Negative for adenopathy.   Psychiatric/Behavioral: Negative for behavioral problems, confusion, self-injury, sleep disturbance and suicidal ideas. The patient is not nervous/anxious.      Objective:   BP (!) 156/98   Pulse 80   Temp 97.9 °F (36.6 °C) (Oral)   Ht 5' 8" (1.727 m)   Wt (!) 164.4 kg (362 lb 7 oz)   LMP 11/27/2018   SpO2 99%   BMI 55.11 kg/m²     Physical Exam   Constitutional: She is oriented to person, place, and time. She appears well-developed and well-nourished. No distress.   obese   HENT:   Head: Normocephalic and atraumatic.   Right Ear: External ear normal.   Left Ear: External ear normal.   Nose: Nose normal.   Mouth/Throat: Oropharynx is clear and moist.   Eyes: Conjunctivae and EOM are normal. Pupils are equal, round, and reactive to light.   Neck: Normal range of motion. Neck supple.   Cardiovascular: Normal rate, regular rhythm and normal heart sounds. Exam reveals no gallop and no friction rub.   No murmur heard.  Pulmonary/Chest: Effort normal " and breath sounds normal. No respiratory distress. She has no wheezes. She has no rales. She exhibits no tenderness.   Musculoskeletal: Normal range of motion.        Right knee: Normal. She exhibits normal range of motion, no swelling, no effusion and no ecchymosis.        Left knee: She exhibits normal range of motion, no swelling, no effusion, no ecchymosis, no deformity, no laceration, no erythema, normal alignment, no LCL laxity, normal patellar mobility, no bony tenderness, normal meniscus and no MCL laxity. Tenderness found. Medial joint line tenderness noted.        Lumbar back: She exhibits tenderness, bony tenderness and pain. She exhibits normal range of motion, no swelling, no edema, no deformity, no laceration, no spasm and normal pulse.   Neurological: She is alert and oriented to person, place, and time.   Skin: Skin is warm and dry. She is not diaphoretic.   Psychiatric: She has a normal mood and affect. Her behavior is normal. Judgment and thought content normal.   Vitals reviewed.    Lab Results   Component Value Date    CHOL 159 06/05/2018    CHOL 208 (H) 11/28/2017    CHOL 186 07/02/2015     Lab Results   Component Value Date    HDL 43 06/05/2018    HDL 43 11/28/2017    HDL 42 07/02/2015     Lab Results   Component Value Date    LDLCALC 104.0 06/05/2018    LDLCALC 143.2 11/28/2017    LDLCALC 131.0 07/02/2015     Lab Results   Component Value Date    TRIG 60 06/05/2018    TRIG 109 11/28/2017    TRIG 65 07/02/2015     Lab Results   Component Value Date    CHOLHDL 27.0 06/05/2018    CHOLHDL 20.7 11/28/2017    CHOLHDL 22.6 07/02/2015       Assessment:     1. Chronic pain of left knee    2. Chronic bilateral low back pain without sciatica    3. Essential hypertension    4. Mixed hyperlipidemia    5. Morbid obesity with BMI of 50.0-59.9, adult      Plan:   Chronic pain of left knee  -     X-ray Knee Ortho Left; Future; Expected date: 12/20/2018  -discussed importance of weight loss through diet and  exercise  -discussed option of PT/OT  -RICE  Educational handout on over-the-counter medications and at-home conservative care, pertinent to the patients diagnosis today, was handed to the patient and discussed in detail.    Chronic bilateral low back pain without sciatica  -discussed importance of weight loss through diet and exercise  -discussed option of PT/OT  -alternate ice and heat. Tylenol for pain  -Educational handout on over-the-counter medications and at-home conservative care, pertinent to the patients diagnosis today, was handed to the patient and discussed in detail.    Essential hypertension  -elevated today. Pt did not take her medications today. Discussed importance of taking medications as prescribed. Advised her to take her BP medications asap.   -will schedule a nurses visit to follow up and recheck her blood prsesure.     Mixed hyperlipidemia  -Stable and controlled on lipitor. Continue current treatment plan as previously prescribed with your PCP.     Morbid obesity with BMI of 50.0-59.9, adult  -discussed importance of diet and exercise with patient today.     Other orders  -     Influenza - Quadrivalent (3 years & older) (PF)      Follow-up if symptoms worsen or fail to improve.

## 2019-01-28 ENCOUNTER — TELEPHONE (OUTPATIENT)
Dept: INTERNAL MEDICINE | Facility: CLINIC | Age: 34
End: 2019-01-28

## 2019-01-28 NOTE — TELEPHONE ENCOUNTER
----- Message from Zahra Lao sent at 1/28/2019  3:06 PM CST -----  Contact: self/[phone]  Returning call, please call back at [phone]. Thanks/ar

## 2019-01-28 NOTE — TELEPHONE ENCOUNTER
----- Message from Eulalia Gomez sent at 1/28/2019 11:27 AM CST -----  Contact: pt  Pt needs to schedule appointment for pain in Buttocks unable to schedule due pts insurance pt is established  ....... [phone] ([place])

## 2019-01-29 ENCOUNTER — OFFICE VISIT (OUTPATIENT)
Dept: INTERNAL MEDICINE | Facility: CLINIC | Age: 34
End: 2019-01-29
Payer: COMMERCIAL

## 2019-01-29 VITALS
OXYGEN SATURATION: 99 % | WEIGHT: 293 LBS | TEMPERATURE: 99 F | SYSTOLIC BLOOD PRESSURE: 130 MMHG | DIASTOLIC BLOOD PRESSURE: 84 MMHG | HEIGHT: 68 IN | BODY MASS INDEX: 44.41 KG/M2 | HEART RATE: 86 BPM

## 2019-01-29 DIAGNOSIS — S76.319A HAMSTRING STRAIN, INITIAL ENCOUNTER: Chronic | ICD-10-CM

## 2019-01-29 DIAGNOSIS — E66.01 MORBID OBESITY WITH BMI OF 50.0-59.9, ADULT: ICD-10-CM

## 2019-01-29 DIAGNOSIS — G89.29 CHRONIC BUTTOCK PAIN: Primary | ICD-10-CM

## 2019-01-29 DIAGNOSIS — M72.2 PLANTAR FASCIITIS OF LEFT FOOT: Chronic | ICD-10-CM

## 2019-01-29 DIAGNOSIS — I42.0 DILATED CARDIOMYOPATHY: Chronic | ICD-10-CM

## 2019-01-29 DIAGNOSIS — M79.18 CHRONIC BUTTOCK PAIN: Primary | ICD-10-CM

## 2019-01-29 PROCEDURE — 99999 PR PBB SHADOW E&M-EST. PATIENT-LVL IV: CPT | Mod: PBBFAC,,, | Performed by: FAMILY MEDICINE

## 2019-01-29 PROCEDURE — 99214 PR OFFICE/OUTPT VISIT, EST, LEVL IV, 30-39 MIN: ICD-10-PCS | Mod: S$GLB,,, | Performed by: FAMILY MEDICINE

## 2019-01-29 PROCEDURE — 99214 OFFICE O/P EST MOD 30 MIN: CPT | Mod: PBBFAC,PN | Performed by: FAMILY MEDICINE

## 2019-01-29 PROCEDURE — 99214 OFFICE O/P EST MOD 30 MIN: CPT | Mod: S$GLB,,, | Performed by: FAMILY MEDICINE

## 2019-01-29 PROCEDURE — 99999 PR PBB SHADOW E&M-EST. PATIENT-LVL IV: ICD-10-PCS | Mod: PBBFAC,,, | Performed by: FAMILY MEDICINE

## 2019-01-29 RX ORDER — METHYLPREDNISOLONE 4 MG/1
4 TABLET ORAL
COMMUNITY
End: 2019-11-08

## 2019-01-29 NOTE — ASSESSMENT & PLAN NOTE
She reports pain on the plantar aspect of her left heel, with the worst pain being the first steps out of bed in the morning.  She says that she was seen by a podiatrist, diagnosed with plantar fasciitis, and given a Medrol Dosepak, which provided relief, but she did not tolerate the steroids due to side effect of nervousness, so she stopped taking them after only a couple of doses.  She resumed taking them this morning because the plantar fasciitis pain was worsening.  She reports no recent relevant trauma.  We discussed treatment options.

## 2019-01-29 NOTE — ASSESSMENT & PLAN NOTE
Primary complaint is pain of buttocks.  Onset greater than a year ago.  Location is bilateral ischia.  Severity described as moderate at worst.  Timing described as occurring primarily when rising from sit to stand, and lasting only several seconds, and then resolving.  Exacerbating factors include prolonged sitting prior to rising and standing.  Alleviating factors have included over-the-counter Tylenol.  We discussed differential diagnosis and treatment options.

## 2019-01-29 NOTE — ASSESSMENT & PLAN NOTE
Wt Readings from Last 5 Encounters:   01/29/19 (!) 163.4 kg (360 lb 3.7 oz)   12/20/18 (!) 164.4 kg (362 lb 7 oz)   08/10/18 (!) 161.2 kg (355 lb 6.8 oz)   06/05/18 (!) 161.3 kg (355 lb 9.6 oz)   11/28/17 (!) 160.6 kg (354 lb 0.9 oz)   This condition appears to be STABLE. She is working on therapeutic lifestyle changes.

## 2019-02-04 ENCOUNTER — TELEPHONE (OUTPATIENT)
Dept: INTERNAL MEDICINE | Facility: CLINIC | Age: 34
End: 2019-02-04

## 2019-02-04 NOTE — TELEPHONE ENCOUNTER
----- Message from Michelle Spaulding sent at 2/4/2019  1:23 PM CST -----  Contact: Pt  Please give pt a call at ..[phone] (home) regarding some orders that she was given to get a splint, she needs copies of the script because she lost it and a list of orthopedics.

## 2019-02-04 NOTE — TELEPHONE ENCOUNTER
----- Message from Nanda Dawkins sent at 2/4/2019  3:18 PM CST -----  Contact: pt  The pt states she is returning a missed call, can be reached at [phone]///thxMW

## 2019-02-04 NOTE — TELEPHONE ENCOUNTER
S/w pt. Pt stated that she lost the order you gave her for the splint. Pt wanted to know if she could get another one because she accidentally threw the other one away. Please advise

## 2019-02-06 NOTE — TELEPHONE ENCOUNTER
Please re-print the order for the splint that I ordered for her. If she wants to use [facility], just fax the order there. If she wants to use an outside DME company, she can  the printed order and take it to them, or she can provide us with their fax number and then please fax it to them. Thanks.

## 2019-02-06 NOTE — TELEPHONE ENCOUNTER
Spoke with patient who advised she just needs the order reprinted and I advised her to come  the order at her convenience . Patient verbalized understanding.

## 2019-04-15 ENCOUNTER — CLINICAL SUPPORT (OUTPATIENT)
Dept: REHABILITATION | Facility: HOSPITAL | Age: 34
End: 2019-04-15
Payer: COMMERCIAL

## 2019-04-15 DIAGNOSIS — M72.2 PLANTAR FASCIITIS OF LEFT FOOT: Primary | Chronic | ICD-10-CM

## 2019-04-15 DIAGNOSIS — S76.319A HAMSTRING STRAIN, INITIAL ENCOUNTER: Chronic | ICD-10-CM

## 2019-04-15 DIAGNOSIS — M25.659 DECREASED RANGE OF HIP MOVEMENT, UNSPECIFIED LATERALITY: ICD-10-CM

## 2019-04-15 DIAGNOSIS — M62.9 HAMSTRING TIGHTNESS OF BOTH LOWER EXTREMITIES: ICD-10-CM

## 2019-04-15 DIAGNOSIS — R29.898 WEAKNESS OF LOWER EXTREMITY, UNSPECIFIED LATERALITY: ICD-10-CM

## 2019-04-15 PROCEDURE — 97162 PT EVAL MOD COMPLEX 30 MIN: CPT

## 2019-04-15 PROCEDURE — 97535 SELF CARE MNGMENT TRAINING: CPT

## 2019-04-23 NOTE — PLAN OF CARE
[facility]  Physical Therapy Initial Evaluation    Name: Carlene Panda  Clinic Number: [id number]    Therapy Diagnosis:   Encounter Diagnoses   Name Primary?    Hamstring tightness of both lower extremities     Weakness of lower extremity, unspecified laterality     Plantar fasciitis of left foot Yes    Hamstring strain, initial encounter     Decreased range of hip movement, unspecified laterality      Physician: LESIA Palacio MD    Physician Orders: PT Eval and Treat   Medical Diagnosis from Referral: Hamstring Strain  Evaluation Date: 4/15/2019  Authorization Period Expiration: 19  Plan of Care Expiration: 19  Visit # / Visits authorized:     Time In: 1:05  Time Out: 2:00  Total Billable Time: 55 minutes    Precautions: Standard and CHF    Subjective   Date of onset: 2019  History of current condition - Carlene reports hx of central low back pain of insidious onset. She reports some pain in post thigh on L, states that she feels like she is going to get a manfred horse when laying on her L side at night but this sensation subsides if she lays on her back or R side. Pt also reports hx of B plantar fasciitis which has come and gone in this past but is now a persistent pain (L>R); she has been wearing night splints for ~1 month and this has helped minimally; she also obtained inserts for her shoes which seemed to help a little at first but the pain has now returned.        Medical History:   Past Medical History:   Diagnosis Date    Cardiomyopathy     Dr Deshpande     CHF (congestive heart failure)     Dilated cardiomyopathy     Hypertension     Plantar fasciitis of left foot 2019       Surgical History:   Carlene Panda  has a past surgical history that includes  section; Tubal ligation (); and [place] tooth extraction.    Medications:   Carlene has a current medication list which includes the following prescription(s): acetaminophen, aspirin,  atorvastatin, carvedilol, cetirizine hcl, diclofenac, entresto, ergocalciferol, furosemide, isosorbide-hydralazine 20-37.5 mg, and methylprednisolone.    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)   Prior Level of Function: Independent and pain free with all ADL's and functional activities  Current Level of Function: Independent with all ADL and functional activities with pain     Pain:  Current 1/10, worst 10/10, best 1/10   Location: Central Low back and Left leg   Description: Aching and Tight  Aggravating Factors: sitting for long period and then trying to walk, transitioning from sitting to standing  Easing Factors: relaxation and pain medication    Pts goals: To decrease pain so she can resume normal functional activities     Objective     Sensation:  Sensation is intact to light touch    ROM   Right (degrees) Left (degrees)   Lumbar Flexion  WFL     Lumbar Extension WFL    Lumbar Sidebending 50 50   Lumbar Rotation WFL WFL    Hip Flexion WFL WFL   Hip Extension WFL WFL   Hip Abduction WFL WFL   Hip Internal Rotation 20 25   Hip External Rotation WFL WFL   Knee Flexion WFL WFL   Knee Extension  WFL WFL   Ankle Dorsiflexion WFL WFL   Ankle Plantarflexion WFL WFL   Ankle Inversion WFL WFL   Ankle Eversion WFL WFL     Joint Mobility   Right Left    Lumbar PA Glide Hypomobile Hypomobile   Lumbar Rotation Hypomobile Hypomobile   Hip Distraction Normal Normal     Strength   Right    Left   Gluteus Lito 3+/5 3+/5   Gluteus Medius 4-/5 4-/5   Hip Adductors  4+/5 4+/5   Psoas 4-/5 4-/5   Quadriceps 4+/5 4+/5   Hamstrings 4/5 4-/5   Hip External Rotators  4/5 4/5   Hip Internal Rotators 4/5 4/5   Anterior Tibialis 4+/5 4+/5   Gastroc/Soleus 1/20  4/20     Special Tests:   Test Right Left   SLUMP negative negative       Palpation: Pt demonstrates increased tone and tenderness with palpation of B lumbar paraspinals, quadratus lumborum, gluteus emiliana, piriformis and L hamstring     Gait Analysis: The patient ambulated with the use of no assistive device and presents with the following gait abnormalities: trandelenberg, increased SMITHA and increased B ankle/foot pronation    Other: Pt presents with postural abnormalities which include: forward head, rounded shoulders , increased thoracic kyphosis , increased lumbar lordosis, genu valgum  and ankle/foot pronation        Function:     LOWER EXTREMITY FUNCTIONAL SCALE    Patient-reported functional assessment scores are rated as follows:  A score of 0/4 represents extreme difficulty or unable to perform the activity. A score of 1/4 represents quite a bit of difficulty. A score of 2/4 represents moderate difficulty. A score of 3/4 represents a little bit of difficulty. A score of 4/4 represents no difficulty.                EVAL   1. Any of your usual work, housework or school activities   3/4  2. Your usual hobbies, sporting     4/4  3. Getting in and out of tub      4/4  4. Walking between rooms      4/4  5. Putting on shoes or socks      4/4  6. Squatting        2/4  7. Lifting an object from the ground      2/4  8. Performing light activities around the home   3/4  9. Performing heavy activities around the home   2/4  10. Getting in and out of car      3/4  11. Walking 2 blocks       4/4  12.Walking a mile       4/4  13. Getting up and down 1 flight of stairs    4/4  14. Standing for 1 hour      3/4  15. Sitting for an hour       0/4  16. Running on even ground      4/4  17. Running on uneven ground     3/4  18. Making sharp turns when running fast    4/4  19. Hopping        4/4  20. Rolling over in bed       4/4    Patient reports 80% ability based on score of the Lower Extremity Functional Scale.        TREATMENT   Treatment Time In: 1:50  Treatment Time Out: 2:00  Total Treatment time separate from Evaluation: 10 minutes    Education provided:   Patient educated on the impairments noted above and the effects of physical therapy intervention to improve overall condition and QOL.       Assessment   Carlene is a 33 y.o. female referred to outpatient Physical Therapy with a medical diagnosis of hamstring strain. The patient presents with impairments which include decreased ROM, decreased strength, postural abnormalities and gait abnormalities.  These impairments are limiting patient's ability to perform ADL's, IADL's and functional activities pain free. Pt prognosis is Fair due to personal factors and co-morbidities listed below. Pt will benefit from skilled outpatient Physical Therapy to address the deficits stated above and in the chart below, provide pt/family education, and to maximize pt's level of independence.     Plan of care discussed with patient: Yes  Pt's spiritual, cultural and educational needs considered and patient is agreeable to the plan of care and goals as stated below:     Anticipated Barriers for therapy: co-morbidities    Medical Necessity is demonstrated by the following  History  Co-morbidities and personal factors that may impact the plan of care Co-morbidities:   CHF, high BMI and HTN    Personal Factors:   social background  lifestyle  attitudes     high   Examination  Body Structures and Functions, activity limitations and participation restrictions that may impact the plan of care Body Regions:   lower extremities    Body Systems:    ROM  strength  gait    Participation Restrictions:   ADL, IADL and functional activities    Activity limitations:   Learning and applying knowledge  no deficits    General Tasks and Commands  no deficits    Communication  no deficits    Mobility  lifting and carrying objects  walking    Self care  no deficits    Domestic Life  shopping  cooking  doing  house work (cleaning house, washing dishes, laundry)  assisting others    Interactions/Relationships  no deficits    Life Areas  no deficits    Community and Social Life  community life  recreation and leisure         high   Clinical Presentation evolving clinical presentation with changing clinical characteristics moderate   Decision Making/ Complexity Score: moderate     Goals:  Short Term Goals:  6 weeks  1. Pain: Pt will demonstrate improved pain by reports of 5/10 worst pain on the verbal rating scale in order to progress toward maximal functional ability and improve QOL.  2. Function: Patient will demonstrate improved function as indicated by a score of greater than or equal to 90% on the Lower Extremity Functional Scale  3. Mobility: Patient will improve AROM to 50% of stated goals in order to progress towards independence with functional activities.   4. Strength: Patient will improve strength to 50% of stated goals in order to progress towards independence with functional activities.   5. Gait: Patient will demonstrate improved gait mechanics including decreased trendelenburg, decreased SMITHA and B foot pronation in order to improve functional mobility, improve quality of life, and decrease risk of further injury or fall.   6. HEP: Patient will demonstrate independence with HEP in order to progress toward functional independence.      Long Term Goals:  12 weeks  1. Pain: Pt will demonstrate improved pain by reports of less than or equal to 1/10 worst pain on the verbal rating scale in order to progress toward maximal functional ability and improve QOL.    2. Mobility: Patient will improve AROM to stated goals in order to return to maximal functional potential and improve quality of life.  3. Strength: Patient will improve strength to stated goals of appropriate musculature in order to improve functional independence and quality of life.  4. Gait: Patient will demonstrate normalized gait mechanics with minimal  compensation in order to return to PLOF.  5. Patient will return to normal ADL's, IADL's, community involvement, recreational activities, and work-related activities with less than or equal to 1/10 pain and maximal function.       Plan   Plan of care Certification: 4/15/2019 to 7/14/19    Outpatient Physical Therapy 2 times weekly for 12 weeks to include the following interventions: Gait Training, Manual Therapy, Patient Education, Self Care, Therapeutic Activites and Therapeutic Exercise.     Marisa Rodriguez, PT, DPT

## 2019-04-24 PROBLEM — M62.9 HAMSTRING TIGHTNESS OF BOTH LOWER EXTREMITIES: Status: ACTIVE | Noted: 2019-04-24

## 2019-04-24 PROBLEM — R29.898 LEG WEAKNESS: Status: ACTIVE | Noted: 2019-04-24

## 2019-04-24 PROBLEM — M25.659 DECREASED RANGE OF HIP MOVEMENT: Status: ACTIVE | Noted: 2019-04-24

## 2019-04-29 ENCOUNTER — CLINICAL SUPPORT (OUTPATIENT)
Dept: REHABILITATION | Facility: HOSPITAL | Age: 34
End: 2019-04-29
Payer: COMMERCIAL

## 2019-04-29 DIAGNOSIS — M25.659 DECREASED RANGE OF HIP MOVEMENT, UNSPECIFIED LATERALITY: ICD-10-CM

## 2019-04-29 DIAGNOSIS — M62.9 HAMSTRING TIGHTNESS OF BOTH LOWER EXTREMITIES: ICD-10-CM

## 2019-04-29 DIAGNOSIS — R29.898 WEAKNESS OF LOWER EXTREMITY, UNSPECIFIED LATERALITY: ICD-10-CM

## 2019-04-29 PROCEDURE — 97140 MANUAL THERAPY 1/> REGIONS: CPT

## 2019-04-29 PROCEDURE — 97535 SELF CARE MNGMENT TRAINING: CPT

## 2019-04-29 PROCEDURE — 97110 THERAPEUTIC EXERCISES: CPT

## 2019-04-29 NOTE — PROGRESS NOTES
Physical Therapy Daily Treatment Note     Name: Carlene Panda   Clinic Number: [id number]    Therapy Diagnosis:   Encounter Diagnoses   Name Primary?    Hamstring tightness of both lower extremities     Weakness of lower extremity, unspecified laterality     Decreased range of hip movement, unspecified laterality      Physician: LESIA Palacio MD    Visit Date: 4/29/2019    Physician Orders: PT Eval and Treat   Medical Diagnosis from Referral: Hamstring Strain  Evaluation Date: 4/15/2019  Authorization Period Expiration: 5/12/19  Plan of Care Expiration: 7/14/19  Visit # / Visits authorized: 2/20     Precautions: Standard and CHF    Time In: 8:01  Time Out: 9:18  Total Billable Time: 77 minutes    SUBJECTIVE   Date of onset: January 2019  History of current condition - Carlene reports hx of central low back pain of insidious onset. She reports some pain in post thigh on L, states that she feels like she is going to get a manfred horse when laying on her L side at night but this sensation subsides if she lays on her back or R side. Pt also reports hx of B plantar fasciitis which has come and gone in this past but is now a persistent pain (L>R); she has been wearing night splints for ~1 month and this has helped minimally; she also obtained inserts for her shoes which seemed to help a little at first but the pain has now returned.     Pt reports: low back pain has improved since initial evaluation. Pt states she was at [employer] last week and was able to walk around for the majority of the day every day with relatively carter  She N/A compliant with home exercise program.  Response to previous treatment: N/A  Functional change: N/A    Pain: 1/10 low back  Location: Central low back     TREATMENT     Carlene received therapeutic exercises to develop strength, endurance, ROM, flexibility and core stabilization for 35 minutes including:    Exercise 4/29/2019       Bike (for LE endurance)  8 minutes       Toe Yoga 2 x  1' each, B        Hamstring Stretch 2 x 1' each       Clamshells 3 x 12 B        Bridges  3 x 10        Heel Raises  2 x 8        Glute Stretch 1' B        Gastroc/Soleus Stretch 1' each, B        x = exercise details same as prior session      Carlene received the following manual therapy techniques: Soft tissue Mobilization were applied to the: B calf and foot for 25 minutes, including:  STM of B gastrocnemius, soleus, posterior tibialis, peroneals, intrinsics of foot    Home Exercises Provided and Patient Education Provided     Education/Self-Care provided:    Patient educated on biomechanical justification for therapeutic exercise and importance of compliance with HEP in order to improve overall impairments and QOL    Patient educated on postural awareness and the use of a coccyx pillow when in a seated position to reduce stress on the coccyx and maintain optimal alignment of the spine.    Patient educated on the importance of improved core and hip strength in order to improve alignment of the spine and lower extremities with static positions and dynamic movement.     Written Home Exercises Provided: yes.  Exercises were reviewed and Carlene was able to demonstrate them prior to the end of the session.  Carlene demonstrated good  understanding of the education provided.     See EMR under Patient Instructions for exercises provided 4/29/2019.    ASSESSMENT   Pt tolerated manual therapy well with reports of decreased pain and tension in musculature. Pt tolerated therapeutic exercise well with reports of increased fatigue but no increased pain. Pt reports increased tightness in calf musculature following heel raises. Pt demonstrated good understanding of therapeutic exercises and required minimal cueing to maintain proper form.    Carlene is progressing well towards her goals.   Pt prognosis is Good.     Pt will continue to benefit from skilled outpatient physical therapy to address the deficits listed in the problem  list box on initial evaluation, provide pt/family education and to maximize pt's level of independence in the home and community environment.     Pt's spiritual, cultural and educational needs considered and pt agreeable to plan of care and goals.     Anticipated Barriers for therapy: co-morbidities       Goals:  Short Term Goals:  6 weeks  1. Pain: Pt will demonstrate improved pain by reports of 5/10 worst pain on the verbal rating scale in order to progress toward maximal functional ability and improve QOL.  2. Function: Patient will demonstrate improved function as indicated by a score of greater than or equal to 90% on the Lower Extremity Functional Scale  3. Mobility: Patient will improve AROM to 50% of stated goals in order to progress towards independence with functional activities.   4. Strength: Patient will improve strength to 50% of stated goals in order to progress towards independence with functional activities.   5. Gait: Patient will demonstrate improved gait mechanics including decreased trendelenburg, decreased SMITHA and B foot pronation in order to improve functional mobility, improve quality of life, and decrease risk of further injury or fall.   6. HEP: Patient will demonstrate independence with HEP in order to progress toward functional independence.        Long Term Goals:  12 weeks  1. Pain: Pt will demonstrate improved pain by reports of less than or equal to 1/10 worst pain on the verbal rating scale in order to progress toward maximal functional ability and improve QOL.    2. Mobility: Patient will improve AROM to stated goals in order to return to maximal functional potential and improve quality of life.  3. Strength: Patient will improve strength to stated goals of appropriate musculature in order to improve functional independence and quality of life.  4. Gait: Patient will demonstrate normalized gait mechanics with minimal compensation in order to return to PLOF.  5. Patient will return  to normal ADL's, IADL's, community involvement, recreational activities, and work-related activities with less than or equal to 1/10 pain and maximal function.       PLAN   Continue Plan of Care (POC) and progress per patient tolerance.    Marisa Rodriguez, PT, DPT

## 2019-05-07 ENCOUNTER — CLINICAL SUPPORT (OUTPATIENT)
Dept: REHABILITATION | Facility: HOSPITAL | Age: 34
End: 2019-05-07
Payer: COMMERCIAL

## 2019-05-07 DIAGNOSIS — M62.9 HAMSTRING TIGHTNESS OF BOTH LOWER EXTREMITIES: ICD-10-CM

## 2019-05-07 DIAGNOSIS — R29.898 WEAKNESS OF LOWER EXTREMITY, UNSPECIFIED LATERALITY: ICD-10-CM

## 2019-05-07 DIAGNOSIS — M25.659 DECREASED RANGE OF HIP MOVEMENT, UNSPECIFIED LATERALITY: ICD-10-CM

## 2019-05-07 PROCEDURE — 97110 THERAPEUTIC EXERCISES: CPT

## 2019-05-07 PROCEDURE — 97140 MANUAL THERAPY 1/> REGIONS: CPT

## 2019-05-07 NOTE — PROGRESS NOTES
Physical Therapy Daily Treatment Note     Name: Carlene Panda   Clinic Number: [id number]    Therapy Diagnosis:   Encounter Diagnoses   Name Primary?    Hamstring tightness of both lower extremities     Weakness of lower extremity, unspecified laterality     Decreased range of hip movement, unspecified laterality      Physician: LESIA Palacio MD    Visit Date: 5/7/2019    Physician Orders: PT Eval and Treat   Medical Diagnosis from Referral: Hamstring Strain  Evaluation Date: 4/15/2019  Authorization Period Expiration: 5/12/19  Plan of Care Expiration: 7/14/19  Visit # / Visits authorized: 3/20     Precautions: Standard and CHF    Time In: 11:00  Time Out: 12:03  Total Billable Time: 63 minutes    SUBJECTIVE   Date of onset: January 2019  History of current condition - Carlene reports hx of central low back pain of insidious onset. She reports some pain in post thigh on L, states that she feels like she is going to get a manfred horse when laying on her L side at night but this sensation subsides if she lays on her back or R side. Pt also reports hx of B plantar fasciitis which has come and gone in this past but is now a persistent pain (L>R); she has been wearing night splints for ~1 month and this has helped minimally; she also obtained inserts for her shoes which seemed to help a little at first but the pain has now returned.     Pt reports: pt reports increased back pain since last visit due to increased stress and need to get up and down at work. Pt states that she ordered two coccyx pillows (one for home and one for work) which should arrive next Tuesday   She was compliant with home exercise program.  Response to previous treatment: decreased tension in B calf musculature following manual therapy   Functional change: pt able to tolerate progression of therapeutic exercise     Pain: 6/10 low back and 4/10 B feet  Location: Central low back and B plantar fascia     TREATMENT     Carlene received therapeutic  exercises to develop strength, endurance, ROM, flexibility and core stabilization for 28 minutes including:    Exercise 4/29/2019 5/7/19      Bike (for LE endurance)  8 minutes X  Level 2       Toe Yoga 2 x 1' each, B        Hamstring Stretch 2 x 1' each       Clamshells 3 x 12 B        Bridges  3 x 10  3 x 12       Heel Raises  2 x 8  2 x 8   Toe Out       Glute Stretch 1' B        Gastroc/Soleus Stretch 1' each, B  2 x 1' B       SLS  2 x 1' B       Plantar Fascia Stretch   1 x 1' B       x = exercise details same as prior session      Carlene received the following manual therapy techniques: Soft tissue Mobilization were applied to the: B calf and foot for 35 minutes, including:  STM of B gluteus emiliana, piriformis, gastrocnemius, soleus    Provided and Patient Education Provided     Education/Self-Care provided:    Patient educated on biomechanical justification for therapeutic exercise and importance of compliance with HEP in order to improve overall impairments and QOL    Patient educated on postural awareness and the use of a coccyx pillow when in a seated position to reduce stress on the coccyx and maintain optimal alignment of the spine.    Patient educated on the importance of improved core and hip strength in order to improve alignment of the spine and lower extremities with static positions and dynamic movement.     Written Home Exercises Provided: yes.  Exercises were reviewed and Carlene was able to demonstrate them prior to the end of the session.  Carlene demonstrated good  understanding of the education provided.     See EMR under Patient Instructions for exercises provided 4/29/2019.    ASSESSMENT   Pt tolerated manual therapy well with reports of decreased pain and tension in musculature. Pt tolerated therapeutic exercise well with reports of increased fatigue but no increased pain. Pt reports increased tightness in calf musculature following heel raises. Pt demonstrated good understanding of  therapeutic exercises and required minimal cueing to maintain proper form.    Carlene is progressing well towards her goals.   Pt prognosis is Good.     Pt will continue to benefit from skilled outpatient physical therapy to address the deficits listed in the problem list box on initial evaluation, provide pt/family education and to maximize pt's level of independence in the home and community environment.     Pt's spiritual, cultural and educational needs considered and pt agreeable to plan of care and goals.     Anticipated Barriers for therapy: co-morbidities       Goals:  Short Term Goals:  6 weeks  1. Pain: Pt will demonstrate improved pain by reports of 5/10 worst pain on the verbal rating scale in order to progress toward maximal functional ability and improve QOL.  2. Function: Patient will demonstrate improved function as indicated by a score of greater than or equal to 90% on the Lower Extremity Functional Scale  3. Mobility: Patient will improve AROM to 50% of stated goals in order to progress towards independence with functional activities.   4. Strength: Patient will improve strength to 50% of stated goals in order to progress towards independence with functional activities.   5. Gait: Patient will demonstrate improved gait mechanics including decreased trendelenburg, decreased SMITHA and B foot pronation in order to improve functional mobility, improve quality of life, and decrease risk of further injury or fall.   6. HEP: Patient will demonstrate independence with HEP in order to progress toward functional independence.        Long Term Goals:  12 weeks  1. Pain: Pt will demonstrate improved pain by reports of less than or equal to 1/10 worst pain on the verbal rating scale in order to progress toward maximal functional ability and improve QOL.    2. Mobility: Patient will improve AROM to stated goals in order to return to maximal functional potential and improve quality of life.  3. Strength: Patient will  improve strength to stated goals of appropriate musculature in order to improve functional independence and quality of life.  4. Gait: Patient will demonstrate normalized gait mechanics with minimal compensation in order to return to PLOF.  5. Patient will return to normal ADL's, IADL's, community involvement, recreational activities, and work-related activities with less than or equal to 1/10 pain and maximal function.       PLAN   Continue Plan of Care (POC) and progress per patient tolerance.    Marisa Rodriguez, PT, DPT

## 2019-05-09 ENCOUNTER — CLINICAL SUPPORT (OUTPATIENT)
Dept: REHABILITATION | Facility: HOSPITAL | Age: 34
End: 2019-05-09
Payer: COMMERCIAL

## 2019-05-09 DIAGNOSIS — R29.898 WEAKNESS OF LOWER EXTREMITY, UNSPECIFIED LATERALITY: ICD-10-CM

## 2019-05-09 DIAGNOSIS — M62.9 HAMSTRING TIGHTNESS OF BOTH LOWER EXTREMITIES: ICD-10-CM

## 2019-05-09 DIAGNOSIS — M25.659 DECREASED RANGE OF HIP MOVEMENT, UNSPECIFIED LATERALITY: ICD-10-CM

## 2019-05-09 PROCEDURE — 97110 THERAPEUTIC EXERCISES: CPT

## 2019-05-09 PROCEDURE — 97140 MANUAL THERAPY 1/> REGIONS: CPT

## 2019-05-10 NOTE — PROGRESS NOTES
Physical Therapy Daily Treatment Note     Name: Carlene Panda   [facility] Number: [id number]    Therapy Diagnosis:   Encounter Diagnoses   Name Primary?    Hamstring tightness of both lower extremities     Weakness of lower extremity, unspecified laterality     Decreased range of hip movement, unspecified laterality      Physician: LESIA Palacio MD    Visit Date: 5/9/2019    Physician Orders: PT Eval and Treat   Medical Diagnosis from Referral: Hamstring Strain  Evaluation Date: 4/15/2019  Authorization Period Expiration: 5/12/19  Plan of Care Expiration: 7/14/19  Visit # / Visits authorized: 4/20     Precautions: Standard and CHF    Time In: 11:00  Time Out: 11:58  Total Billable Time: 58 minutes    SUBJECTIVE   Date of onset: January 2019  History of current condition - Carlene reports hx of central low back pain of insidious onset. She reports some pain in post thigh on L, states that she feels like she is going to get a manfred horse when laying on her L side at night but this sensation subsides if she lays on her back or R side. Pt also reports hx of B plantar fasciitis which has come and gone in this past but is now a persistent pain (L>R); she has been wearing night splints for ~1 month and this has helped minimally; she also obtained inserts for her shoes which seemed to help a little at first but the pain has now returned.     Pt reports: pt reports decreased back and B plantar fascia pain since previous visit. Pt states she has been using a frozen water bottle to roll out her B plantar fascia and states that this is helping to work out some of the tension in the arches of her feet  She was compliant with home exercise program.  Response to previous treatment: decreased tension in low back and B calf musculature following manual therapy. Soreness following therapeutic exercises.   Functional change: pt able to tolerate progression of therapeutic exercise     Pain: 1/10 low back and 2/10 B feet  Location:  Central low back and B plantar fascia     TREATMENT     Carlene received therapeutic exercises to develop strength, endurance, ROM, flexibility and core stabilization for 35 minutes including:    Exercise 4/29/2019 5/7/19 5/9/19     Bike (for LE endurance)  8 minutes X  Level 2  x     Toe Yoga 2 x 1' each, B   x     Hamstring Stretch 2 x 1' each       Clamshells 3 x 12 B   x     Bridges  3 x 10  3 x 12  x     Heel Raises  2 x 8  2 x 8   Toe Out  x     Glute Stretch 1' B        Gastroc/Soleus Stretch 1' each, B  2 x 1' B  x     SLS  2 x 1' B       Plantar Fascia Stretch   1 x 1' B       x = exercise details same as prior session      Carlene received the following manual therapy techniques: Soft tissue Mobilization were applied to the: B calf and foot for 25 minutes, including:  STM of B gluteus emiliana, piriformis, gastrocnemius, soleus, posterior tibialis, peroneals, intrinsic muscles of B feet.     Provided and Patient Education Provided     Education/Self-Care provided:    Patient educated on biomechanical justification for therapeutic exercise and importance of compliance with HEP in order to improve overall impairments and QOL    Patient educated on the importance of improved core and hip strength in order to improve alignment of the spine and lower extremities with static positions and dynamic movement.     Written Home Exercises Provided: yes.  Exercises were reviewed and Carlene was able to demonstrate them prior to the end of the session.  Carlene demonstrated good  understanding of the education provided.     See EMR under Patient Instructions for exercises provided 4/29/2019.    ASSESSMENT   Pt tolerated manual therapy well with reports of decreased pain and tension in musculature. Pt tolerated therapeutic exercise well with reports of increased fatigue but no increased pain. Pt reports increased tightness in calf musculature following heel raises. Pt demonstrated good understanding of therapeutic exercises and  required minimal cueing to maintain proper form.    Carlene is progressing well towards her goals.   Pt prognosis is Good.     Pt will continue to benefit from skilled outpatient physical therapy to address the deficits listed in the problem list box on initial evaluation, provide pt/family education and to maximize pt's level of independence in the home and community environment.     Pt's spiritual, cultural and educational needs considered and pt agreeable to plan of care and goals.     Anticipated Barriers for therapy: co-morbidities       Goals:  Short Term Goals:  6 weeks  1. Pain: Pt will demonstrate improved pain by reports of 5/10 worst pain on the verbal rating scale in order to progress toward maximal functional ability and improve QOL.  2. Function: Patient will demonstrate improved function as indicated by a score of greater than or equal to 90% on the Lower Extremity Functional Scale  3. Mobility: Patient will improve AROM to 50% of stated goals in order to progress towards independence with functional activities.   4. Strength: Patient will improve strength to 50% of stated goals in order to progress towards independence with functional activities.   5. Gait: Patient will demonstrate improved gait mechanics including decreased trendelenburg, decreased SMITHA and B foot pronation in order to improve functional mobility, improve quality of life, and decrease risk of further injury or fall.   6. HEP: Patient will demonstrate independence with HEP in order to progress toward functional independence.        Long Term Goals:  12 weeks  1. Pain: Pt will demonstrate improved pain by reports of less than or equal to 1/10 worst pain on the verbal rating scale in order to progress toward maximal functional ability and improve QOL.    2. Mobility: Patient will improve AROM to stated goals in order to return to maximal functional potential and improve quality of life.  3. Strength: Patient will improve strength to  stated goals of appropriate musculature in order to improve functional independence and quality of life.  4. Gait: Patient will demonstrate normalized gait mechanics with minimal compensation in order to return to PLOF.  5. Patient will return to normal ADL's, IADL's, community involvement, recreational activities, and work-related activities with less than or equal to 1/10 pain and maximal function.       PLAN   Continue Plan of Care (POC) and progress per patient tolerance.    Marisa Rodriguez, PT, DPT

## 2019-05-23 ENCOUNTER — CLINICAL SUPPORT (OUTPATIENT)
Dept: REHABILITATION | Facility: HOSPITAL | Age: 34
End: 2019-05-23
Payer: COMMERCIAL

## 2019-05-23 DIAGNOSIS — M62.9 HAMSTRING TIGHTNESS OF BOTH LOWER EXTREMITIES: ICD-10-CM

## 2019-05-23 DIAGNOSIS — M25.659 DECREASED RANGE OF HIP MOVEMENT, UNSPECIFIED LATERALITY: ICD-10-CM

## 2019-05-23 DIAGNOSIS — R29.898 WEAKNESS OF LOWER EXTREMITY, UNSPECIFIED LATERALITY: ICD-10-CM

## 2019-05-23 PROCEDURE — 97014 ELECTRIC STIMULATION THERAPY: CPT

## 2019-05-23 PROCEDURE — 97110 THERAPEUTIC EXERCISES: CPT

## 2019-05-23 PROCEDURE — 97140 MANUAL THERAPY 1/> REGIONS: CPT

## 2019-05-23 NOTE — PROGRESS NOTES
Physical Therapy Daily Treatment Note     Name: Carlene Panda   [facility] Number: [id number]    Therapy Diagnosis:   Encounter Diagnoses   Name Primary?    Hamstring tightness of both lower extremities     Weakness of lower extremity, unspecified laterality     Decreased range of hip movement, unspecified laterality      Physician: LESIA Palacio MD    Visit Date: 5/23/2019    Physician Orders: PT Eval and Treat   Medical Diagnosis from Referral: Hamstring Strain  Evaluation Date: 4/15/2019  Authorization Period Expiration: 5/12/19  Plan of Care Expiration: 7/14/19  Visit # / Visits authorized: 5/20     Precautions: Standard and CHF    Time In: 4:03  Time Out: 5:08  Total Billable Time: 65 minutes    SUBJECTIVE   Date of onset: January 2019  History of current condition - Carlene reports hx of central low back pain of insidious onset. She reports some pain in post thigh on L, states that she feels like she is going to get a manfred horse when laying on her L side at night but this sensation subsides if she lays on her back or R side. Pt also reports hx of B plantar fasciitis which has come and gone in this past but is now a persistent pain (L>R); she has been wearing night splints for ~1 month and this has helped minimally; she also obtained inserts for her shoes which seemed to help a little at first but the pain has now returned.     Pt reports: pt reports very high pain in low back today after not coming to therapy last week; states her boss unexpectedly went on medical leave and she now has triple the workload; states she is very stressed. She states B plantar fascia pain has improved and she is currently only noticing low back pain.   She was not compliant with home exercise program.  Response to previous treatment: decreased tension in low back and B calf musculature following manual therapy. Soreness following therapeutic exercises.   Functional change: pt unable to progress therex today due to increased low  back pain    Pain: 9/10 low back and 1/10 B feet  Location: Central low back and B plantar fascia     TREATMENT     Carlene received therapeutic exercises to develop strength, endurance, ROM, flexibility and core stabilization for 17 minutes including:    Exercise 4/29/2019 5/7/19 5/9/19 5/23/19    Bike (for LE endurance)  8 minutes X  Level 2  x Level 3   8 minutes    Toe Yoga 2 x 1' each, B   x     Hamstring Stretch 2 x 1' each   X  seated    Clamshells 3 x 12 B   x Seated   2 x 10     Bridges  3 x 10  3 x 12  x     Heel Raises  2 x 8  2 x 8   Toe Out  x     Glute Stretch 1' B        Gastroc/Soleus Stretch 1' each, B  2 x 1' B  x     SLS  2 x 1' B       Plantar Fascia Stretch   1 x 1' B       Piriformis Stretch    2 x 1' B     Glute Sets    2 x 10     Adductor Squeeze    2 x 10     Seated Marches    2 x 10 B     Abdominal Bracing    2 x 10             x = exercise details same as prior session      Carlene received the following manual therapy techniques: Soft tissue Mobilization were applied to the: B calf and foot for 38 minutes, including:  STM of B gluteus emiliana, piriformis, gastrocnemius, soleus, posterior tibialis, peroneals, intrinsic muscles of B feet.     Pt received estim and moist hot pack to her low back for 10 minutes     Provided and Patient Education Provided     Education/Self-Care provided:    Patient educated on biomechanical justification for therapeutic exercise and importance of compliance with HEP in order to improve overall impairments and QOL    Patient educated on the importance of improved core and hip strength in order to improve alignment of the spine and lower extremities with static positions and dynamic movement.     Written Home Exercises Provided: yes.  Exercises were reviewed and Carlene was able to demonstrate them prior to the end of the session.  Carlene demonstrated good  understanding of the education provided.     See EMR under Patient Instructions for exercises provided  4/29/2019.    ASSESSMENT   Pt tolerated manual therapy well with reports of decreased pain and tension in musculature (1/10 post treatment). Pt was instructed in a series of therapeutic exercises which she can perform while at work due to increased work load and pts reported inability to perform previously given HEP. Pt demonstrated good understanding of therapeutic exercises and required no cueing to maintain proper form. Pt tolerated estim and heat well and feels that a portable TENS unit would be beneficial to reduce her pain while at work.     Carlene is progressing well towards her goals.   Pt prognosis is Good.     Pt will continue to benefit from skilled outpatient physical therapy to address the deficits listed in the problem list box on initial evaluation, provide pt/family education and to maximize pt's level of independence in the home and community environment.     Pt's spiritual, cultural and educational needs considered and pt agreeable to plan of care and goals.     Anticipated Barriers for therapy: co-morbidities       Goals:  Short Term Goals:  6 weeks  1. Pain: Pt will demonstrate improved pain by reports of 5/10 worst pain on the verbal rating scale in order to progress toward maximal functional ability and improve QOL.  2. Function: Patient will demonstrate improved function as indicated by a score of greater than or equal to 90% on the Lower Extremity Functional Scale  3. Mobility: Patient will improve AROM to 50% of stated goals in order to progress towards independence with functional activities.   4. Strength: Patient will improve strength to 50% of stated goals in order to progress towards independence with functional activities.   5. Gait: Patient will demonstrate improved gait mechanics including decreased trendelenburg, decreased SMITHA and B foot pronation in order to improve functional mobility, improve quality of life, and decrease risk of further injury or fall.   6. HEP: Patient will  demonstrate independence with HEP in order to progress toward functional independence.        Long Term Goals:  12 weeks  1. Pain: Pt will demonstrate improved pain by reports of less than or equal to 1/10 worst pain on the verbal rating scale in order to progress toward maximal functional ability and improve QOL.    2. Mobility: Patient will improve AROM to stated goals in order to return to maximal functional potential and improve quality of life.  3. Strength: Patient will improve strength to stated goals of appropriate musculature in order to improve functional independence and quality of life.  4. Gait: Patient will demonstrate normalized gait mechanics with minimal compensation in order to return to PLOF.  5. Patient will return to normal ADL's, IADL's, community involvement, recreational activities, and work-related activities with less than or equal to 1/10 pain and maximal function.       PLAN   Continue Plan of Care (POC) and progress per patient tolerance.    Marisa Rodriguez, PT, DPT

## 2019-05-27 ENCOUNTER — CLINICAL SUPPORT (OUTPATIENT)
Dept: REHABILITATION | Facility: HOSPITAL | Age: 34
End: 2019-05-27
Payer: COMMERCIAL

## 2019-05-27 DIAGNOSIS — R29.898 WEAKNESS OF LOWER EXTREMITY, UNSPECIFIED LATERALITY: ICD-10-CM

## 2019-05-27 DIAGNOSIS — M25.659 DECREASED RANGE OF HIP MOVEMENT, UNSPECIFIED LATERALITY: ICD-10-CM

## 2019-05-27 DIAGNOSIS — M62.9 HAMSTRING TIGHTNESS OF BOTH LOWER EXTREMITIES: ICD-10-CM

## 2019-05-27 PROCEDURE — 97140 MANUAL THERAPY 1/> REGIONS: CPT

## 2019-05-27 PROCEDURE — 97110 THERAPEUTIC EXERCISES: CPT

## 2019-05-28 NOTE — PROGRESS NOTES
Physical Therapy Daily Treatment Note     Name: Carlene Panda   Clinic Number: [id number]    Therapy Diagnosis:   Encounter Diagnoses   Name Primary?    Hamstring tightness of both lower extremities     Weakness of lower extremity, unspecified laterality     Decreased range of hip movement, unspecified laterality      Physician: LESIA Palacio MD    Visit Date: 5/27/2019    Physician Orders: PT Eval and Treat   Medical Diagnosis from Referral: Hamstring Strain  Evaluation Date: 4/15/2019  Authorization Period Expiration: 6/30/19  Plan of Care Expiration: 7/14/19  Visit # / Visits authorized: 6 (visit 2 of 12)     Precautions: Standard and CHF    Time In: 10:33  Time Out: 11:30  Total Billable Time: 57 minutes    SUBJECTIVE   Date of onset: January 2019  History of current condition - Carlene reports hx of central low back pain of insidious onset. She reports some pain in post thigh on L, states that she feels like she is going to get a manfred horse when laying on her L side at night but this sensation subsides if she lays on her back or R side. Pt also reports hx of B plantar fasciitis which has come and gone in this past but is now a persistent pain (L>R); she has been wearing night splints for ~1 month and this has helped minimally; she also obtained inserts for her shoes which seemed to help a little at first but the pain has now returned.     Pt reports: pt reports that she continues to have some pain in low back but that it has improved overall since her last session. Pt reports minor pain in L heel.   She was not compliant with home exercise program.  Response to previous treatment: decreased tension in low back musculature following manual therapy.   Functional change: pt better tolerated therex today due to decreased low back pain     Pain: 4/10 low back and 1/10 L heel pain   Location: Central low back and plantar fascia     TREATMENT     Carlene received therapeutic exercises to develop strength,  endurance, ROM, flexibility and core stabilization for 38 minutes including:    Exercise 4/29/2019 5/7/19 5/9/19 5/23/19 5/27/19   Bike (for LE endurance)  8 minutes X  Level 2  x Level 3   8 minutes x   Toe Yoga 2 x 1' each, B   x     Hamstring Stretch 2 x 1' each   X  seated    Clamshells 3 x 12 B   x Seated   2 x 10     Bridges  3 x 10  3 x 12  x  x   Heel Raises  2 x 8  2 x 8   Toe Out  x  3 x 8    Glute Stretch 1' B        Gastroc/Soleus Stretch 1' each, B  2 x 1' B  x  x   SLS  2 x 1' B    x   Plantar Fascia Stretch   1 x 1' B       Piriformis Stretch    2 x 1' B  x   Glute Sets    2 x 10     Adductor Squeeze    2 x 10     Seated Marches    2 x 10 B     Abdominal Bracing    2 x 10     Trunk Rotations      2 x 10   x = exercise details same as prior session      Carlene received the following manual therapy techniques: Soft tissue Mobilization were applied to the: B calf and foot for 15 minutes, including:  STM of B gluteus emiliana and piriformis. STM of R gastrocnemius, soleus, posterior tibialis, peroneals, intrinsic muscles of B feet.       Provided and Patient Education Provided     Education/Self-Care provided: (5 minutes)   Patient educated on biomechanical justification for therapeutic exercise and importance of compliance with HEP in order to improve overall impairments and QOL    Patient educated on the importance of improved core and hip strength in order to improve alignment of the spine and lower extremities with static positions and dynamic movement.     Written Home Exercises Provided: yes.  Exercises were reviewed and Carlene was able to demonstrate them prior to the end of the session.  Carlene demonstrated good  understanding of the education provided.     See EMR under Patient Instructions for exercises provided 5/23/19.    ASSESSMENT   Pt tolerated manual therapy well with reports of decreased pain and tension in musculature. Pt tolerated therapeutic exercises well and reports increased muscle  fatigue but no increase in pain. Pt demonstrated good understanding of therapeutic exercises and required no cueing to maintain proper form.     Carlene is progressing well towards her goals.   Pt prognosis is Good.     Pt will continue to benefit from skilled outpatient physical therapy to address the deficits listed in the problem list box on initial evaluation, provide pt/family education and to maximize pt's level of independence in the home and community environment.     Pt's spiritual, cultural and educational needs considered and pt agreeable to plan of care and goals.     Anticipated Barriers for therapy: co-morbidities       Goals:  Short Term Goals:  6 weeks  1. Pain: Pt will demonstrate improved pain by reports of 5/10 worst pain on the verbal rating scale in order to progress toward maximal functional ability and improve QOL.  2. Function: Patient will demonstrate improved function as indicated by a score of greater than or equal to 90% on the Lower Extremity Functional Scale  3. Mobility: Patient will improve AROM to 50% of stated goals in order to progress towards independence with functional activities.   4. Strength: Patient will improve strength to 50% of stated goals in order to progress towards independence with functional activities.   5. Gait: Patient will demonstrate improved gait mechanics including decreased trendelenburg, decreased SMITHA and B foot pronation in order to improve functional mobility, improve quality of life, and decrease risk of further injury or fall.   6. HEP: Patient will demonstrate independence with HEP in order to progress toward functional independence.        Long Term Goals:  12 weeks  1. Pain: Pt will demonstrate improved pain by reports of less than or equal to 1/10 worst pain on the verbal rating scale in order to progress toward maximal functional ability and improve QOL.    2. Mobility: Patient will improve AROM to stated goals in order to return to maximal  functional potential and improve quality of life.  3. Strength: Patient will improve strength to stated goals of appropriate musculature in order to improve functional independence and quality of life.  4. Gait: Patient will demonstrate normalized gait mechanics with minimal compensation in order to return to PLOF.  5. Patient will return to normal ADL's, IADL's, community involvement, recreational activities, and work-related activities with less than or equal to 1/10 pain and maximal function.       PLAN   Continue Plan of Care (POC) and progress per patient tolerance.    Marisa Rodriguez, PT, DPT

## 2019-05-30 ENCOUNTER — CLINICAL SUPPORT (OUTPATIENT)
Dept: REHABILITATION | Facility: HOSPITAL | Age: 34
End: 2019-05-30
Payer: COMMERCIAL

## 2019-05-30 DIAGNOSIS — R29.898 WEAKNESS OF LOWER EXTREMITY, UNSPECIFIED LATERALITY: ICD-10-CM

## 2019-05-30 DIAGNOSIS — M62.9 HAMSTRING TIGHTNESS OF BOTH LOWER EXTREMITIES: ICD-10-CM

## 2019-05-30 DIAGNOSIS — M25.659 DECREASED RANGE OF HIP MOVEMENT, UNSPECIFIED LATERALITY: ICD-10-CM

## 2019-05-30 PROCEDURE — 97140 MANUAL THERAPY 1/> REGIONS: CPT

## 2019-05-30 PROCEDURE — 97535 SELF CARE MNGMENT TRAINING: CPT

## 2019-05-30 PROCEDURE — 97110 THERAPEUTIC EXERCISES: CPT

## 2019-05-30 NOTE — PROGRESS NOTES
Physical Therapy Daily Treatment Note     Name: Carlene Panda   Clinic Number: [id number]    Therapy Diagnosis:   Encounter Diagnoses   Name Primary?    Hamstring tightness of both lower extremities     Weakness of lower extremity, unspecified laterality     Decreased range of hip movement, unspecified laterality      Physician: LESIA Palacio MD    Visit Date: 5/30/2019    Physician Orders: PT Eval and Treat   Medical Diagnosis from Referral: Hamstring Strain  Evaluation Date: 4/15/2019  Authorization Period Expiration: 6/30/19  Plan of Care Expiration: 7/14/19  Visit # / Visits authorized: 7 (visit 3 of 12)     Precautions: Standard and CHF    Time In: 4:08  Time Out: 5:02  Total Billable Time: 54 minutes    SUBJECTIVE   Date of onset: January 2019  History of current condition - Carlene reports hx of central low back pain of insidious onset. She reports some pain in post thigh on L, states that she feels like she is going to get a manfred horse when laying on her L side at night but this sensation subsides if she lays on her back or R side. Pt also reports hx of B plantar fasciitis which has come and gone in this past but is now a persistent pain (L>R); she has been wearing night splints for ~1 month and this has helped minimally; she also obtained inserts for her shoes which seemed to help a little at first but the pain has now returned.     Pt reports: pt reports continued low back pain which is most bothersome when transitioning from sit to stand or stand to sit. Pt states she had no pain after leaving PT last session, she was able to get in and out of the car pain free; however, when the pt got home she sat on the couch for a few hours and when she got up her pain had returned.   She was not compliant with home exercise program.  Response to previous treatment: decreased tension in low back musculature following manual therapy.   Functional change: no significant functional changes     Pain: 4/10 low back  "and 0/10 L heel pain   Location: Central low back and plantar fascia     TREATMENT     Carlene received therapeutic exercises to develop strength, endurance, ROM, flexibility and core stabilization for 30 minutes including:    Exercise 4/29/2019 5/7/19 5/9/19 5/23/19 5/27/19 5/30/19    Bike (for LE endurance)  8 minutes X  Level 2  x Level 3   8 minutes x x    Toe Yoga 2 x 1' each, B   x       Hamstring Stretch 2 x 1' each   X  seated      Clamshells 3 x 12 B   x Seated   2 x 10       Bridges  3 x 10  3 x 12  x  x     Heel Raises  2 x 8  2 x 8   Toe Out  x  3 x 8      Glute Stretch 1' B          Gastroc/Soleus Stretch 1' each, B  2 x 1' B  x  x     SLS  2 x 1' B    x     Plantar Fascia Stretch   1 x 1' B         Piriformis Stretch    2 x 1' B  x     Glute Sets    2 x 10       Adductor Squeeze    2 x 10       Seated Marches    2 x 10 B       Abdominal Bracing    2 x 10       Trunk Rotations      2 x 10     Seated Hip IR       RTB  3 x 12     Belt Block       2 x 10 each  5" hold     Lateral Resistors       RTB  2 laps               x = exercise details same as prior session      Carlene received the following manual therapy techniques: Soft tissue Mobilization were applied to the: low back for 15 minutes, including:  STM of B gluteus emiliana and piriformis.     Provided and Patient Education Provided     Education/Self-Care provided: (8 minutes)   Patient educated on biomechanical justification for therapeutic exercise and importance of compliance with HEP in order to improve overall impairments and QOL    Patient educated on the importance of improved core and hip strength in order to improve alignment of the spine and lower extremities with static positions and dynamic movement.     Written Home Exercises Provided: yes.  Exercises were reviewed and Carlene was able to demonstrate them prior to the end of the session.  Carlene demonstrated good  understanding of the education provided.     See EMR under Patient " Instructions for exercises provided 5/23/19.    ASSESSMENT   Pt tolerated manual therapy well with reports of decreased pain and tension in musculature. Pt reports 0/10 pain in low back and no pain with transitioning from sit to stand following manual therapy.  Pt tolerated therapeutic exercises well and reports increased muscle fatigue but no increase in pain. Pt demonstrated good understanding of therapeutic exercises and required no cueing to maintain proper form.     Carlene is progressing well towards her goals.   Pt prognosis is Good.     Pt will continue to benefit from skilled outpatient physical therapy to address the deficits listed in the problem list box on initial evaluation, provide pt/family education and to maximize pt's level of independence in the home and community environment.     Pt's spiritual, cultural and educational needs considered and pt agreeable to plan of care and goals.     Anticipated Barriers for therapy: co-morbidities       Goals:  Short Term Goals:  6 weeks  1. Pain: Pt will demonstrate improved pain by reports of 5/10 worst pain on the verbal rating scale in order to progress toward maximal functional ability and improve QOL.  2. Function: Patient will demonstrate improved function as indicated by a score of greater than or equal to 90% on the Lower Extremity Functional Scale  3. Mobility: Patient will improve AROM to 50% of stated goals in order to progress towards independence with functional activities.   4. Strength: Patient will improve strength to 50% of stated goals in order to progress towards independence with functional activities.   5. Gait: Patient will demonstrate improved gait mechanics including decreased trendelenburg, decreased SMITHA and B foot pronation in order to improve functional mobility, improve quality of life, and decrease risk of further injury or fall.   6. HEP: Patient will demonstrate independence with HEP in order to progress toward functional  independence.        Long Term Goals:  12 weeks  1. Pain: Pt will demonstrate improved pain by reports of less than or equal to 1/10 worst pain on the verbal rating scale in order to progress toward maximal functional ability and improve QOL.    2. Mobility: Patient will improve AROM to stated goals in order to return to maximal functional potential and improve quality of life.  3. Strength: Patient will improve strength to stated goals of appropriate musculature in order to improve functional independence and quality of life.  4. Gait: Patient will demonstrate normalized gait mechanics with minimal compensation in order to return to PLOF.  5. Patient will return to normal ADL's, IADL's, community involvement, recreational activities, and work-related activities with less than or equal to 1/10 pain and maximal function.       PLAN   Continue Plan of Care (POC) and progress per patient tolerance.    Marisa Rodriguez, PT, DPT

## 2019-06-02 ENCOUNTER — PATIENT MESSAGE (OUTPATIENT)
Dept: INTERNAL MEDICINE | Facility: CLINIC | Age: 34
End: 2019-06-02

## 2019-06-05 ENCOUNTER — CLINICAL SUPPORT (OUTPATIENT)
Dept: REHABILITATION | Facility: HOSPITAL | Age: 34
End: 2019-06-05
Payer: COMMERCIAL

## 2019-06-05 DIAGNOSIS — M25.659 DECREASED RANGE OF HIP MOVEMENT, UNSPECIFIED LATERALITY: ICD-10-CM

## 2019-06-05 DIAGNOSIS — R29.898 WEAKNESS OF LOWER EXTREMITY, UNSPECIFIED LATERALITY: ICD-10-CM

## 2019-06-05 DIAGNOSIS — M62.9 HAMSTRING TIGHTNESS OF BOTH LOWER EXTREMITIES: ICD-10-CM

## 2019-06-05 PROCEDURE — 97110 THERAPEUTIC EXERCISES: CPT

## 2019-06-05 PROCEDURE — 97140 MANUAL THERAPY 1/> REGIONS: CPT

## 2019-06-05 PROCEDURE — 97535 SELF CARE MNGMENT TRAINING: CPT

## 2019-06-06 NOTE — PROGRESS NOTES
Physical Therapy Daily Treatment Note     Name: Carlene Panda   [facility] Number: [id number]    Therapy Diagnosis:   Encounter Diagnoses   Name Primary?    Hamstring tightness of both lower extremities     Weakness of lower extremity, unspecified laterality     Decreased range of hip movement, unspecified laterality      Physician: LESIA Palacio MD    Visit Date: 6/5/2019    Physician Orders: PT Eval and Treat   Medical Diagnosis from Referral: Hamstring Strain  Evaluation Date: 4/15/2019  Authorization Period Expiration: 6/30/19  Plan of Care Expiration: 7/14/19  Visit # / Visits authorized: 8 (visit 4 of 12)     Precautions: Standard and CHF    Time In: 4:08  Time Out: 5:01  Total Billable Time: 53 minutes    SUBJECTIVE   Date of onset: January 2019  History of current condition - Carlene reports hx of central low back pain of insidious onset. She reports some pain in post thigh on L, states that she feels like she is going to get a manfred horse when laying on her L side at night but this sensation subsides if she lays on her back or R side. Pt also reports hx of B plantar fasciitis which has come and gone in this past but is now a persistent pain (L>R); she has been wearing night splints for ~1 month and this has helped minimally; she also obtained inserts for her shoes which seemed to help a little at first but the pain has now returned.     Pt reports: pt reports continued low back pain. Pt states she had no pain after leaving PT last session, she was able to get in and out of the car pain free; however, when the pt got home she sat on the couch for a few hours and when she got up her pain had returned. Pt states she was in tears Monday due to pain.   She was not compliant with home exercise program.  Response to previous treatment: decreased tension in low back musculature following manual therapy.   Functional change: no significant functional changes     Pain: 4/10 low back and 0/10 L heel pain  "  Location: Central low back and plantar fascia     TREATMENT     Carlene received therapeutic exercises to develop strength, endurance, ROM, flexibility and core stabilization for 30 minutes including:    Exercise 4/29/2019 5/7/19 5/9/19 5/23/19 5/27/19 5/30/19 6/5/19   Bike (for LE endurance)  8 minutes X  Level 2  x Level 3   8 minutes x x 5 minutes   L3    Toe Yoga 2 x 1' each, B   x       Hamstring Stretch 2 x 1' each   X  seated      Clamshells 3 x 12 B   x Seated   2 x 10       Bridges  3 x 10  3 x 12  x  x     Heel Raises  2 x 8  2 x 8   Toe Out  x  3 x 8   x   Glute Stretch 1' B          Gastroc/Soleus Stretch 1' each, B  2 x 1' B  x  x     SLS  2 x 1' B    x  x   Plantar Fascia Stretch   1 x 1' B         Piriformis Stretch    2 x 1' B  x     Glute Sets    2 x 10       Adductor Squeeze    2 x 10       Seated Marches    2 x 10 B       Abdominal Bracing    2 x 10       Trunk Rotations      2 x 10     Seated Hip IR       RTB  3 x 12  x   Belt Block       2 x 10 each  5" hold  x   Lateral Resistors       RTB  2 laps  x             x = exercise details same as prior session      Carlene received the following manual therapy techniques: Soft tissue Mobilization were applied to the: low back for 15 minutes, including:  STM of B gluteus emiliana and piriformis.     Provided and Patient Education Provided     Education/Self-Care provided: (8 minutes)   Patient educated on biomechanical justification for therapeutic exercise and importance of compliance with HEP in order to improve overall impairments and QOL    Patient educated on the importance of improved core and hip strength in order to improve alignment of the spine and lower extremities with static positions and dynamic movement.     Written Home Exercises Provided: yes.  Exercises were reviewed and Carlene was able to demonstrate them prior to the end of the session.  Carlene demonstrated good  understanding of the education provided.     See EMR under Patient " Instructions for exercises provided 5/23/19.    ASSESSMENT   Pt tolerated manual therapy well with reports of decreased pain and tension in musculature. Pt reports 0/10 pain in low back and no pain with transitioning from sit to stand following manual therapy.  Pt tolerated therapeutic exercises well and reports increased muscle fatigue but no increase in pain. Pt demonstrated good understanding of therapeutic exercises and required no cueing to maintain proper form.     Carlene is progressing well towards her goals.   Pt prognosis is Good.     Pt will continue to benefit from skilled outpatient physical therapy to address the deficits listed in the problem list box on initial evaluation, provide pt/family education and to maximize pt's level of independence in the home and community environment.     Pt's spiritual, cultural and educational needs considered and pt agreeable to plan of care and goals.     Anticipated Barriers for therapy: co-morbidities       Goals:  Short Term Goals:  6 weeks  1. Pain: Pt will demonstrate improved pain by reports of 5/10 worst pain on the verbal rating scale in order to progress toward maximal functional ability and improve QOL.  2. Function: Patient will demonstrate improved function as indicated by a score of greater than or equal to 90% on the Lower Extremity Functional Scale  3. Mobility: Patient will improve AROM to 50% of stated goals in order to progress towards independence with functional activities.   4. Strength: Patient will improve strength to 50% of stated goals in order to progress towards independence with functional activities.   5. Gait: Patient will demonstrate improved gait mechanics including decreased trendelenburg, decreased SMITHA and B foot pronation in order to improve functional mobility, improve quality of life, and decrease risk of further injury or fall.   6. HEP: Patient will demonstrate independence with HEP in order to progress toward functional  independence.        Long Term Goals:  12 weeks  1. Pain: Pt will demonstrate improved pain by reports of less than or equal to 1/10 worst pain on the verbal rating scale in order to progress toward maximal functional ability and improve QOL.    2. Mobility: Patient will improve AROM to stated goals in order to return to maximal functional potential and improve quality of life.  3. Strength: Patient will improve strength to stated goals of appropriate musculature in order to improve functional independence and quality of life.  4. Gait: Patient will demonstrate normalized gait mechanics with minimal compensation in order to return to PLOF.  5. Patient will return to normal ADL's, IADL's, community involvement, recreational activities, and work-related activities with less than or equal to 1/10 pain and maximal function.       PLAN   Continue Plan of Care (POC) and progress per patient tolerance.    Marisa Rodriguez, PT, DPT

## 2019-06-07 ENCOUNTER — TELEPHONE (OUTPATIENT)
Dept: INTERNAL MEDICINE | Facility: CLINIC | Age: 34
End: 2019-06-07

## 2019-06-07 DIAGNOSIS — M54.50 CHRONIC BILATERAL LOW BACK PAIN WITHOUT SCIATICA: Primary | ICD-10-CM

## 2019-06-07 DIAGNOSIS — G89.29 CHRONIC BILATERAL LOW BACK PAIN WITHOUT SCIATICA: Primary | ICD-10-CM

## 2019-06-07 NOTE — TELEPHONE ENCOUNTER
----- Message from Marisa Rodriguez PT sent at 6/5/2019  1:42 PM CDT -----      ----- Message -----  From: Marisa Rodriguez PT  Sent: 5/24/2019   7:38 AM  To: LESIA Palacio MD    Hi Dr. Palacio,     This pt has expressed decreased pain from use of a TENS unit in PT and I feel that it may be beneficial for her to have a personal unit for use at home. Can you please put an order in the system for a TENS unit so we can get her set up at home?     Thanks,   Marisa Rodriguez PT, DPT

## 2019-06-12 NOTE — TELEPHONE ENCOUNTER
PHYSICIAN ORDERS    PATIENT:   Carlene Panda,  1985; MRN [id number]  DATE OF VISIT:  19    ORDERS  1. TENS unit with supplies; use as directed by physical therapy.    SUPPORTING DIAGNOSES    ICD-10-CM   1. Chronic bilateral low back pain without sciatica M54.5    G89.29          LESIA Palacio MD, NPI: [id number]    Orders Placed This Encounter   Procedures    PT  tens instruction     Standing Status:   Future     Standing Expiration Date:   2020     Order Specific Question:   Precautions:     Answer:   N/A

## 2019-08-13 ENCOUNTER — DOCUMENTATION ONLY (OUTPATIENT)
Dept: REHABILITATION | Facility: HOSPITAL | Age: 34
End: 2019-08-13

## 2019-08-13 NOTE — PROGRESS NOTES
Outpatient Therapy Discharge Summary     Name: Carlene Panda  [facility] Number: [id number]    Therapy Diagnosis:        Encounter Diagnoses   Name Primary?    Hamstring tightness of both lower extremities      Weakness of lower extremity, unspecified laterality      Decreased range of hip movement, unspecified laterality        Physician: LESIA Palacio MD    Physician Orders: PT Eval and Treat   Medical Diagnosis from Referral: Hamstring Strain  Evaluation Date: 4/15/2019      Date of Last visit: 8/13/2019  Total Visits Received: 8  Cancelled Visits: 12  No Show Visits: 4    Assessment    Goals: Goals not met due to patient only attending 8 appointments and goal status not reassessed.     Discharge reason: Patient has not attended therapy since 6/5/19    Plan   This patient is discharged from PT.    Marisa Rodriguez, PT, DPT

## 2019-10-15 DIAGNOSIS — E78.2 MIXED HYPERLIPIDEMIA: ICD-10-CM

## 2019-10-15 DIAGNOSIS — E55.9 VITAMIN D DEFICIENCY: ICD-10-CM

## 2019-10-15 RX ORDER — ATORVASTATIN CALCIUM 20 MG/1
20 TABLET, FILM COATED ORAL DAILY
Qty: 30 TABLET | Refills: 0 | Status: SHIPPED | OUTPATIENT
Start: 2019-10-15 | End: 2019-11-20 | Stop reason: SDUPTHER

## 2019-10-15 RX ORDER — ERGOCALCIFEROL 1.25 MG/1
50000 CAPSULE ORAL
Qty: 10 CAPSULE | Refills: 0 | OUTPATIENT
Start: 2019-10-15

## 2019-11-08 ENCOUNTER — OFFICE VISIT (OUTPATIENT)
Dept: INTERNAL MEDICINE | Facility: CLINIC | Age: 34
End: 2019-11-08
Payer: COMMERCIAL

## 2019-11-08 VITALS
OXYGEN SATURATION: 98 % | BODY MASS INDEX: 44.41 KG/M2 | SYSTOLIC BLOOD PRESSURE: 134 MMHG | HEIGHT: 68 IN | HEART RATE: 79 BPM | WEIGHT: 293 LBS | TEMPERATURE: 98 F | DIASTOLIC BLOOD PRESSURE: 86 MMHG

## 2019-11-08 DIAGNOSIS — E66.01 MORBID OBESITY WITH BMI OF 50.0-59.9, ADULT: ICD-10-CM

## 2019-11-08 DIAGNOSIS — I50.22 CHRONIC SYSTOLIC CONGESTIVE HEART FAILURE: ICD-10-CM

## 2019-11-08 DIAGNOSIS — G89.29 CHRONIC BILATERAL LOW BACK PAIN WITHOUT SCIATICA: ICD-10-CM

## 2019-11-08 DIAGNOSIS — I11.0 HYPERTENSIVE HEART DISEASE WITH HEART FAILURE: ICD-10-CM

## 2019-11-08 DIAGNOSIS — Z29.9 PREVENTIVE MEASURE: ICD-10-CM

## 2019-11-08 DIAGNOSIS — I42.0 DILATED CARDIOMYOPATHY: Chronic | ICD-10-CM

## 2019-11-08 DIAGNOSIS — M54.50 CHRONIC BILATERAL LOW BACK PAIN WITHOUT SCIATICA: ICD-10-CM

## 2019-11-08 DIAGNOSIS — G43.719 INTRACTABLE CHRONIC MIGRAINE WITHOUT AURA AND WITHOUT STATUS MIGRAINOSUS: Primary | ICD-10-CM

## 2019-11-08 DIAGNOSIS — E78.2 MIXED HYPERLIPIDEMIA: ICD-10-CM

## 2019-11-08 DIAGNOSIS — I27.20 PULMONARY HYPERTENSION: ICD-10-CM

## 2019-11-08 DIAGNOSIS — I10 ESSENTIAL HYPERTENSION: ICD-10-CM

## 2019-11-08 PROBLEM — S76.319A HAMSTRING STRAIN, INITIAL ENCOUNTER: Chronic | Status: RESOLVED | Noted: 2019-01-29 | Resolved: 2019-11-08

## 2019-11-08 PROBLEM — M62.9 HAMSTRING TIGHTNESS OF BOTH LOWER EXTREMITIES: Status: RESOLVED | Noted: 2019-04-24 | Resolved: 2019-11-08

## 2019-11-08 PROBLEM — M25.659 DECREASED RANGE OF HIP MOVEMENT: Status: RESOLVED | Noted: 2019-04-24 | Resolved: 2019-11-08

## 2019-11-08 PROBLEM — I34.0 MITRAL VALVE INSUFFICIENCY: Status: ACTIVE | Noted: 2019-11-08

## 2019-11-08 PROBLEM — R29.898 LEG WEAKNESS: Status: RESOLVED | Noted: 2019-04-24 | Resolved: 2019-11-08

## 2019-11-08 PROBLEM — M72.2 PLANTAR FASCIITIS OF LEFT FOOT: Chronic | Status: RESOLVED | Noted: 2019-01-29 | Resolved: 2019-11-08

## 2019-11-08 PROBLEM — I50.9 CHF (CONGESTIVE HEART FAILURE): Status: ACTIVE | Noted: 2019-11-08

## 2019-11-08 PROBLEM — G47.33 OSA (OBSTRUCTIVE SLEEP APNEA): Status: ACTIVE | Noted: 2019-10-23

## 2019-11-08 PROCEDURE — 99999 PR PBB SHADOW E&M-EST. PATIENT-LVL III: CPT | Mod: PBBFAC,,, | Performed by: FAMILY MEDICINE

## 2019-11-08 PROCEDURE — 3075F PR MOST RECENT SYSTOLIC BLOOD PRESS GE 130-139MM HG: ICD-10-PCS | Mod: CPTII,S$GLB,, | Performed by: FAMILY MEDICINE

## 2019-11-08 PROCEDURE — 3075F SYST BP GE 130 - 139MM HG: CPT | Mod: CPTII,S$GLB,, | Performed by: FAMILY MEDICINE

## 2019-11-08 PROCEDURE — 90686 FLU VACCINE (QUAD) GREATER THAN OR EQUAL TO 3YO PRESERVATIVE FREE IM: ICD-10-PCS | Mod: S$GLB,,, | Performed by: FAMILY MEDICINE

## 2019-11-08 PROCEDURE — 90471 IMMUNIZATION ADMIN: CPT | Mod: S$GLB,,, | Performed by: FAMILY MEDICINE

## 2019-11-08 PROCEDURE — 90686 IIV4 VACC NO PRSV 0.5 ML IM: CPT | Mod: S$GLB,,, | Performed by: FAMILY MEDICINE

## 2019-11-08 PROCEDURE — 3079F DIAST BP 80-89 MM HG: CPT | Mod: CPTII,S$GLB,, | Performed by: FAMILY MEDICINE

## 2019-11-08 PROCEDURE — 99214 PR OFFICE/OUTPT VISIT, EST, LEVL IV, 30-39 MIN: ICD-10-PCS | Mod: 25,S$GLB,, | Performed by: FAMILY MEDICINE

## 2019-11-08 PROCEDURE — 90471 FLU VACCINE (QUAD) GREATER THAN OR EQUAL TO 3YO PRESERVATIVE FREE IM: ICD-10-PCS | Mod: S$GLB,,, | Performed by: FAMILY MEDICINE

## 2019-11-08 PROCEDURE — 99999 PR PBB SHADOW E&M-EST. PATIENT-LVL III: ICD-10-PCS | Mod: PBBFAC,,, | Performed by: FAMILY MEDICINE

## 2019-11-08 PROCEDURE — 99214 OFFICE O/P EST MOD 30 MIN: CPT | Mod: 25,S$GLB,, | Performed by: FAMILY MEDICINE

## 2019-11-08 PROCEDURE — 3008F BODY MASS INDEX DOCD: CPT | Mod: CPTII,S$GLB,, | Performed by: FAMILY MEDICINE

## 2019-11-08 PROCEDURE — 3008F PR BODY MASS INDEX (BMI) DOCUMENTED: ICD-10-PCS | Mod: CPTII,S$GLB,, | Performed by: FAMILY MEDICINE

## 2019-11-08 PROCEDURE — 3079F PR MOST RECENT DIASTOLIC BLOOD PRESSURE 80-89 MM HG: ICD-10-PCS | Mod: CPTII,S$GLB,, | Performed by: FAMILY MEDICINE

## 2019-11-08 RX ORDER — BUTALBITAL, ACETAMINOPHEN AND CAFFEINE 50; 325; 40 MG/1; MG/1; MG/1
1 TABLET ORAL 3 TIMES DAILY PRN
Qty: 30 TABLET | Refills: 0 | Status: SHIPPED | OUTPATIENT
Start: 2019-11-08 | End: 2019-12-08

## 2019-11-08 RX ORDER — METHOCARBAMOL 750 MG/1
750 TABLET, FILM COATED ORAL DAILY PRN
Qty: 15 TABLET | Refills: 0 | Status: SHIPPED | OUTPATIENT
Start: 2019-11-08 | End: 2019-11-18

## 2019-11-08 NOTE — PROGRESS NOTES
Subjective:       Patient ID: Carlene Panda is a 34 y.o. female.    Chief Complaint: Headache; Back Pain; and Neck Pain    34-year-old  female patient with Patient Active Problem List:     Essential hypertension     Dilated cardiomyopathy     Morbid obesity with BMI of 50.0-59.9, adult     Mixed hyperlipidemia     Chronic buttock pain     Pulmonary hypertension     ITZEL (obstructive sleep apnea)     Mitral valve insufficiency     Hypertensive heart disease with heart failure     Chronic systolic congestive heart failure  Here with complaint of headache for the past 10 days associated with sensitivity to light and noise but not unilateral, patient has also been having neck pain, and has tried changing the neck pillows.  Patient has not got sleep apnea evaluation yet as recommended by her cardiologist.   Patient also reports bilateral buttock pains which has been ongoing, patient has finished physical therapy in the past which was helpful.   Has not been exercising lately except for minimal walking  Patient had seen her cardiologist Dr. Hernandezently and her blood pressure has been stable    Review of Systems   Constitutional: Positive for activity change and unexpected weight change. Negative for fatigue.   HENT: Negative for hearing loss, rhinorrhea and trouble swallowing.    Eyes: Negative for discharge.   Respiratory: Negative for chest tightness, shortness of breath and wheezing.    Cardiovascular: Negative for chest pain, palpitations and leg swelling.   Gastrointestinal: Negative for abdominal pain, blood in stool, constipation, diarrhea, nausea and vomiting.   Endocrine: Negative for polydipsia and polyuria.   Genitourinary: Negative for difficulty urinating, dysuria, hematuria and menstrual problem.   Musculoskeletal: Positive for arthralgias, back pain and myalgias. Negative for joint swelling and neck pain.   Skin: Negative for rash.   Neurological: Positive for headaches. Negative for  "weakness, light-headedness and numbness.   Psychiatric/Behavioral: Negative for confusion, dysphoric mood and sleep disturbance.         /86 (BP Location: Left arm, Patient Position: Sitting, BP Method: Large (Automatic))   Pulse 79   Temp 98.2 °F (36.8 °C) (Oral)   Ht 5' 8" (1.727 m)   Wt (!) 164.7 kg (363 lb 1.6 oz)   SpO2 98%   BMI 55.21 kg/m²   Objective:      Physical Exam   Constitutional: She is oriented to person, place, and time. She appears well-developed and well-nourished.   HENT:   Head: Normocephalic and atraumatic.   Mouth/Throat: Oropharynx is clear and moist.   Cardiovascular: Normal rate, regular rhythm and normal heart sounds.   No murmur heard.  Pulmonary/Chest: Effort normal and breath sounds normal. She has no wheezes.   Abdominal: Soft. Bowel sounds are normal. There is no tenderness.   Musculoskeletal: She exhibits tenderness. She exhibits no edema.   Positive for bilateral buttock pain but no paraspinal lumbar muscle tenderness noted in the midline  Straight leg raise test negative bilaterally   Neurological: She is alert and oriented to person, place, and time. No cranial nerve deficit or sensory deficit.   Skin: Skin is warm and dry. No rash noted.   Psychiatric: She has a normal mood and affect.         Assessment/Plan:   1. Intractable chronic migraine without aura and without status migrainosus  - butalbital-acetaminophen-caffeine -40 mg (FIORICET, ESGIC) -40 mg per tablet; Take 1 tablet by mouth 3 (three) times daily as needed for Pain.  Dispense: 30 tablet; Refill: 0  - methocarbamol (ROBAXIN) 750 MG Tab; Take 1 tablet (750 mg total) by mouth daily as needed.  Dispense: 15 tablet; Refill: 0  Headaches could be likely secondary to migraines  Will do a trial of Fioricet and Robaxin for symptomatic relief  If symptoms continue to persist or worsen patient let us know  Also encouraged to consider getting further evaluated for sleep apnea  Encouraged to avoid " caffeine and carbonated beverages  Drink adequate fluids    2. Essential hypertension  Blood pressure is stable today currently on carvedilol 12.5 mg twice daily    3. Hypertensive heart disease with heart failure  4. Chronic systolic congestive heart failure  5. Dilated cardiomyopathy  6. Pulmonary hypertension  Followed by cardiologist Dr obrien, stable blood pressures noted recently  Continue carvedilol Entresto Lasix and Bidil    7. Morbid obesity with BMI of 50.0-59.9, adult  Strict lifestyle changes recommended with diet and exercise to lose weight with BMI 55 which can also be playing a role with back pain    8. Mixed hyperlipidemia  Currently taking Lipitor 20 mg daily    9. Chronic bilateral low back pain without sciatica  - methocarbamol (ROBAXIN) 750 MG Tab; Take 1 tablet (750 mg total) by mouth daily as needed.  Dispense: 15 tablet; Refill: 0  Do a trial of Robaxin and advised to do back stretches to relief the back ache and work on losing weight  Reviewed previous x-rays of the back showing normal findings    10. Preventive measure  - CBC auto differential; Future  - Comprehensive metabolic panel; Future  - Lipid panel; Future  - TSH; Future  - Hemoglobin A1c; Future  Will check fasting labs and follow-up in 2 weeks     Flu shot given today

## 2019-11-15 ENCOUNTER — LAB VISIT (OUTPATIENT)
Dept: LAB | Facility: HOSPITAL | Age: 34
End: 2019-11-15
Payer: COMMERCIAL

## 2019-11-15 DIAGNOSIS — Z29.9 PREVENTIVE MEASURE: ICD-10-CM

## 2019-11-15 LAB
BASOPHILS # BLD AUTO: 0.03 K/UL (ref 0–0.2)
BASOPHILS NFR BLD: 0.8 % (ref 0–1.9)
DIFFERENTIAL METHOD: ABNORMAL
EOSINOPHIL # BLD AUTO: 0.3 K/UL (ref 0–0.5)
EOSINOPHIL NFR BLD: 8.5 % (ref 0–8)
ERYTHROCYTE [DISTWIDTH] IN BLOOD BY AUTOMATED COUNT: 13.8 % (ref 11.5–14.5)
ESTIMATED AVG GLUCOSE: 128 MG/DL (ref 68–131)
HBA1C MFR BLD HPLC: 6.1 % (ref 4–5.6)
HCT VFR BLD AUTO: 37.9 % (ref 37–48.5)
HGB BLD-MCNC: 12.1 G/DL (ref 12–16)
IMM GRANULOCYTES # BLD AUTO: 0.01 K/UL (ref 0–0.04)
IMM GRANULOCYTES NFR BLD AUTO: 0.3 % (ref 0–0.5)
LYMPHOCYTES # BLD AUTO: 1 K/UL (ref 1–4.8)
LYMPHOCYTES NFR BLD: 23.9 % (ref 18–48)
MCH RBC QN AUTO: 28.1 PG (ref 27–31)
MCHC RBC AUTO-ENTMCNC: 31.9 G/DL (ref 32–36)
MCV RBC AUTO: 88 FL (ref 82–98)
MONOCYTES # BLD AUTO: 0.3 K/UL (ref 0.3–1)
MONOCYTES NFR BLD: 8.5 % (ref 4–15)
NEUTROPHILS # BLD AUTO: 2.3 K/UL (ref 1.8–7.7)
NEUTROPHILS NFR BLD: 58 % (ref 38–73)
NRBC BLD-RTO: 0 /100 WBC
PLATELET # BLD AUTO: 192 K/UL (ref 150–350)
PMV BLD AUTO: 12.5 FL (ref 9.2–12.9)
RBC # BLD AUTO: 4.3 M/UL (ref 4–5.4)
WBC # BLD AUTO: 3.98 K/UL (ref 3.9–12.7)

## 2019-11-15 PROCEDURE — 36415 COLL VENOUS BLD VENIPUNCTURE: CPT

## 2019-11-15 PROCEDURE — 85025 COMPLETE CBC W/AUTO DIFF WBC: CPT

## 2019-11-15 PROCEDURE — 83036 HEMOGLOBIN GLYCOSYLATED A1C: CPT

## 2019-11-15 PROCEDURE — 80053 COMPREHEN METABOLIC PANEL: CPT

## 2019-11-15 PROCEDURE — 80061 LIPID PANEL: CPT

## 2019-11-15 PROCEDURE — 84443 ASSAY THYROID STIM HORMONE: CPT

## 2019-11-16 LAB
ALBUMIN SERPL BCP-MCNC: 3.5 G/DL (ref 3.5–5.2)
ALP SERPL-CCNC: 50 U/L (ref 55–135)
ALT SERPL W/O P-5'-P-CCNC: 12 U/L (ref 10–44)
ANION GAP SERPL CALC-SCNC: 9 MMOL/L (ref 8–16)
AST SERPL-CCNC: 12 U/L (ref 10–40)
BILIRUB SERPL-MCNC: 0.5 MG/DL (ref 0.1–1)
BUN SERPL-MCNC: 15 MG/DL (ref 6–20)
CALCIUM SERPL-MCNC: 9.4 MG/DL (ref 8.7–10.5)
CHLORIDE SERPL-SCNC: 103 MMOL/L (ref 95–110)
CHOLEST SERPL-MCNC: 142 MG/DL (ref 120–199)
CHOLEST/HDLC SERPL: 3.2 {RATIO} (ref 2–5)
CO2 SERPL-SCNC: 24 MMOL/L (ref 23–29)
CREAT SERPL-MCNC: 1 MG/DL (ref 0.5–1.4)
EST. GFR  (AFRICAN AMERICAN): >60 ML/MIN/1.73 M^2
EST. GFR  (NON AFRICAN AMERICAN): >60 ML/MIN/1.73 M^2
GLUCOSE SERPL-MCNC: 107 MG/DL (ref 70–110)
HDLC SERPL-MCNC: 45 MG/DL (ref 40–75)
HDLC SERPL: 31.7 % (ref 20–50)
LDLC SERPL CALC-MCNC: 81.6 MG/DL (ref 63–159)
NONHDLC SERPL-MCNC: 97 MG/DL
POTASSIUM SERPL-SCNC: 3.8 MMOL/L (ref 3.5–5.1)
PROT SERPL-MCNC: 8 G/DL (ref 6–8.4)
SODIUM SERPL-SCNC: 136 MMOL/L (ref 136–145)
TRIGL SERPL-MCNC: 77 MG/DL (ref 30–150)
TSH SERPL DL<=0.005 MIU/L-ACNC: 1.12 UIU/ML (ref 0.4–4)

## 2019-11-20 DIAGNOSIS — E78.2 MIXED HYPERLIPIDEMIA: ICD-10-CM

## 2019-11-21 RX ORDER — ATORVASTATIN CALCIUM 20 MG/1
TABLET, FILM COATED ORAL
Qty: 90 TABLET | Refills: 0 | Status: SHIPPED | OUTPATIENT
Start: 2019-11-21 | End: 2020-05-04

## 2020-01-10 ENCOUNTER — OFFICE VISIT (OUTPATIENT)
Dept: INTERNAL MEDICINE | Facility: CLINIC | Age: 35
End: 2020-01-10
Payer: COMMERCIAL

## 2020-01-10 ENCOUNTER — HOSPITAL ENCOUNTER (OUTPATIENT)
Dept: RADIOLOGY | Facility: HOSPITAL | Age: 35
Discharge: HOME | End: 2020-01-10
Attending: PHYSICIAN ASSISTANT
Payer: COMMERCIAL

## 2020-01-10 VITALS
TEMPERATURE: 98 F | HEART RATE: 74 BPM | SYSTOLIC BLOOD PRESSURE: 128 MMHG | BODY MASS INDEX: 44.41 KG/M2 | OXYGEN SATURATION: 96 % | DIASTOLIC BLOOD PRESSURE: 70 MMHG | HEIGHT: 68 IN | WEIGHT: 293 LBS

## 2020-01-10 DIAGNOSIS — M25.562 ACUTE PAIN OF LEFT KNEE: ICD-10-CM
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
## 2020-01-10 DIAGNOSIS — E66.01 MORBID OBESITY WITH BMI OF 50.0-59.9, ADULT: ICD-10-CM
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
## 2020-01-10 DIAGNOSIS — I10 ESSENTIAL HYPERTENSION: ICD-10-CM

## 2020-01-10 PROCEDURE — 73560 X-RAY EXAM OF KNEE 1 OR 2: CPT | Mod: 26,RT,, | Performed by: RADIOLOGY

## 2020-01-10 PROCEDURE — 3008F BODY MASS INDEX DOCD: CPT | Mod: CPTII,S$GLB,, | Performed by: PHYSICIAN ASSISTANT

## 2020-01-10 PROCEDURE — 3008F PR BODY MASS INDEX (BMI) DOCUMENTED: ICD-10-PCS | Mod: CPTII,S$GLB,, | Performed by: PHYSICIAN ASSISTANT

## 2020-01-10 PROCEDURE — 3074F PR MOST RECENT SYSTOLIC BLOOD PRESSURE < 130 MM HG: ICD-10-PCS | Mod: CPTII,S$GLB,, | Performed by: PHYSICIAN ASSISTANT

## 2020-01-10 PROCEDURE — 73562 XR KNEE ORTHO LEFT: ICD-10-PCS | Mod: 26,LT,, | Performed by: RADIOLOGY

## 2020-01-10 PROCEDURE — 3078F PR MOST RECENT DIASTOLIC BLOOD PRESSURE < 80 MM HG: ICD-10-PCS | Mod: CPTII,S$GLB,, | Performed by: PHYSICIAN ASSISTANT

## 2020-01-10 PROCEDURE — 73562 X-RAY EXAM OF KNEE 3: CPT | Mod: 26,LT,, | Performed by: RADIOLOGY

## 2020-01-10 PROCEDURE — 3078F DIAST BP <80 MM HG: CPT | Mod: CPTII,S$GLB,, | Performed by: PHYSICIAN ASSISTANT

## 2020-01-10 PROCEDURE — 99999 PR PBB SHADOW E&M-EST. PATIENT-LVL IV: CPT | Mod: PBBFAC,,, | Performed by: PHYSICIAN ASSISTANT

## 2020-01-10 PROCEDURE — 99214 PR OFFICE/OUTPT VISIT, EST, LEVL IV, 30-39 MIN: ICD-10-PCS | Mod: S$GLB,,, | Performed by: PHYSICIAN ASSISTANT

## 2020-01-10 PROCEDURE — 73560 XR KNEE ORTHO LEFT: ICD-10-PCS | Mod: 26,RT,, | Performed by: RADIOLOGY

## 2020-01-10 PROCEDURE — 99999 PR PBB SHADOW E&M-EST. PATIENT-LVL IV: ICD-10-PCS | Mod: PBBFAC,,, | Performed by: PHYSICIAN ASSISTANT

## 2020-01-10 PROCEDURE — 73560 X-RAY EXAM OF KNEE 1 OR 2: CPT | Mod: TC,RT

## 2020-01-10 PROCEDURE — 99214 OFFICE O/P EST MOD 30 MIN: CPT | Mod: S$GLB,,, | Performed by: PHYSICIAN ASSISTANT

## 2020-01-10 PROCEDURE — 3074F SYST BP LT 130 MM HG: CPT | Mod: CPTII,S$GLB,, | Performed by: PHYSICIAN ASSISTANT

## 2020-01-10 RX ORDER — NAPROXEN 500 MG/1
500 TABLET ORAL 2 TIMES DAILY WITH MEALS
Qty: 14 TABLET | Refills: 0 | Status: SHIPPED | OUTPATIENT
Start: 2020-01-10 | End: 2020-01-17

## 2020-01-10 NOTE — PROGRESS NOTES
Subjective:      Patient ID: Carlene Panda is a 34 y.o. female.    Chief Complaint: Knee Pain (left)    Knee Pain    The incident occurred 12 to 24 hours ago. The injury mechanism was a fall. The pain is present in the left knee (left medial). The quality of the pain is described as aching. The pain is at a severity of 8/10. Pertinent negatives include no numbness. She reports no foreign bodies present. She has tried ice for the symptoms. The treatment provided mild relief.   Slipped at [employer] yesterday. Denies any swelling. Does feel like her knee is going to give out. Walks with a limp.     Patient Active Problem List   Diagnosis    Essential hypertension    Dilated cardiomyopathy    Morbid obesity with BMI of 50.0-59.9, adult    Mixed hyperlipidemia    Chronic buttock pain    Pulmonary hypertension    ITZEL (obstructive sleep apnea)    Mitral valve insufficiency    Hypertensive heart disease with heart failure    Chronic systolic congestive heart failure       Review of Systems   Constitutional: Negative for activity change, appetite change, chills, diaphoresis, fatigue, fever and unexpected weight change.   HENT: Negative.  Negative for congestion, hearing loss, postnasal drip, rhinorrhea, sore throat, trouble swallowing and voice change.    Eyes: Negative.  Negative for visual disturbance.   Respiratory: Negative.  Negative for cough, choking, chest tightness and shortness of breath.    Cardiovascular: Negative for chest pain, palpitations and leg swelling.   Gastrointestinal: Negative for abdominal distention, abdominal pain, blood in stool, constipation, diarrhea, nausea and vomiting.   Endocrine: Negative for cold intolerance, heat intolerance, polydipsia and polyuria.   Genitourinary: Negative.  Negative for difficulty urinating and frequency.   Musculoskeletal: Positive for arthralgias and joint swelling. Negative for back pain, gait problem and myalgias.   Skin: Negative for color change,  "pallor, rash and wound.   Neurological: Negative for dizziness, tremors, weakness, light-headedness, numbness and headaches.   Hematological: Negative for adenopathy.   Psychiatric/Behavioral: Negative for behavioral problems, confusion, self-injury, sleep disturbance and suicidal ideas. The patient is not nervous/anxious.      Objective:   /70 (BP Location: Left arm, Patient Position: Sitting, BP Method: Large (Manual))   Pulse 74   Temp 98.4 °F (36.9 °C) (Tympanic)   Ht 5' 8" (1.727 m)   Wt (!) 164.8 kg (363 lb 5.1 oz)   SpO2 96%   BMI 55.24 kg/m²     Physical Exam   Constitutional: She is oriented to person, place, and time. She appears well-developed and well-nourished. No distress.   HENT:   Head: Normocephalic and atraumatic.   Cardiovascular: Normal rate, regular rhythm and normal heart sounds. Exam reveals no gallop and no friction rub.   No murmur heard.  Pulmonary/Chest: Effort normal and breath sounds normal. No stridor. No respiratory distress. She has no wheezes.   Musculoskeletal: Normal range of motion.        Left knee: She exhibits effusion. She exhibits normal range of motion, no swelling, no ecchymosis, no deformity, no laceration, no erythema, normal alignment, no LCL laxity, normal patellar mobility, no bony tenderness, normal meniscus and no MCL laxity. Tenderness found. Medial joint line and MCL tenderness noted. No lateral joint line, no LCL and no patellar tendon tenderness noted.        Left lower leg: She exhibits no swelling and no edema.   Neurological: She is alert and oriented to person, place, and time.   Skin: Skin is warm. No rash noted. She is not diaphoretic. No erythema.   Psychiatric: She has a normal mood and affect. Her behavior is normal. Judgment and thought content normal.   Nursing note and vitals reviewed.      Assessment:     1. Fall, initial encounter    2. Acute pain of left knee    3. Essential hypertension    4. Morbid obesity with BMI of 50.0-59.9, adult "      Plan:   Fall, initial encounter  -     X-ray Knee Ortho Left; Future; Expected date: 01/10/2020    Acute pain of left knee  -     X-ray Knee Ortho Left; Future; Expected date: 01/10/2020  -     naproxen (NAPROSYN) 500 MG tablet; Take 1 tablet (500 mg total) by mouth 2 (two) times daily with meals. for 7 days  Dispense: 14 tablet; Refill: 0  -RICE  Educational handout on over-the-counter medications and at-home conservative care, pertinent to the patients diagnosis today, was handed to the patient and discussed in detail.  -if no improvement in 1-2 weeks, see ortho    Essential hypertension  -stable on current medications.     Morbid obesity with BMI of 50.0-59.9, adult      Follow up if symptoms worsen or fail to improve.

## 2020-01-10 NOTE — PATIENT INSTRUCTIONS
Knee Sprain of the Collateral Ligaments  The knee is a hinge joint supported by four strong ligaments. The two ligaments inside the knee protect this joint from excess forward and backward movement. The ligaments on the outside of the joint prevent side-to-side motion. These are called the collateral ligaments.  The medial collateral ligament is located on the inner side of the joint; and the lateral collateral ligament is on the outer side of the joint.  You have sprained one or both collateral ligaments. A sprain is a tearing of a ligament. The tear may be partial or complete. Diagnosis is made by physical exam. In the case of an acute injury, the knee may be too swollen or painful to examine fully. A more accurate exam can be done after the initial swelling goes down.  Symptoms of a knee sprain include immediate knee swelling, pain, and difficulty walking. Initial treatment includes rest, splinting the knee to reduce movement of the joint, and icing the knee to reduce swelling and pain. You may use over-the-counter pain medicine to control pain, unless another medicine was prescribed. Most sprains will heal in 3 to 6 weeks. A severe injury can take 3 to 4 months to heal. You will also need rehab exercises. Surgery is usually not required for sprains involving only the collateral ligaments.  Home care  · Stay off the injured leg as much as possible until you can walk on it without pain. If you have a lot of pain while walking, crutches, or a walker may be prescribed. These can be rented or purchased at many pharmacies and surgical or orthopedic supply stores. Follow your healthcare providers advice about when to begin bearing weight on that leg.   · If you were given a hook-and-loop closure knee brace, you can remove it to bathe. But leave it in place when walking, sitting, or lying down unless told otherwise.  · Apply an ice pack over the injured area for 15 to 20 minutes every 3 to 6 hours. You should do this  for the first 24 to 48 hours. You can make an ice pack by filling a plastic bag that seals at the top with ice cubes and then wrapping it with a thin towel. Continue to use ice packs for relief of pain and swelling as needed. As the ice melts, be careful to avoid getting your wrap, splint, or cast wet. After 48 hours, apply heat (warm shower or warm bath) for 15 to 20 minutes several times a day, or alternate ice and heat. You can place the ice pack directly over the splint. If you have to wear a hook-and-loop knee brace, you can open it to apply the ice pack, or heat, directly to the knee. Never put ice directly on the skin. Always wrap the ice in a towel or other type of cloth.  · You may use over-the-counter pain medicine to control pain, unless another pain medicine was prescribed. Anti-inflammatory pain medicines, such as ibuprofen or naproxen may be more effective than acetaminophen. If you have chronic liver or kidney disease or ever had a stomach ulcer or GI bleeding, talk with your healthcare provider before using these medicines.  Follow-up care  Follow up with your healthcare provider as advised. Any X-rays you had today dont show any broken bones, breaks, or fractures. Sometimes fractures dont show up on the first X-ray. Bruises and sprains can sometimes hurt as much as a fracture. These injuries can take time to heal completely. If your symptoms dont improve or they get worse, talk with your healthcare provider. You may need a repeat X-ray. If X-rays were taken, you will be told of any new findings that may affect your care.  Call 911  Call 911 if you have:  ·  Shortness of breath  ·  Chest pain  When to seek medical advice  Call your healthcare provider right away if any of these occur:  · Pain or swelling increases  · Swelling, redness, or pain in the calf or thigh  Date Last Reviewed: 11/20/2015  © 2826-2262 [employer]. [address] [place] [address], [place] [address]. All rights reserved.  This information is not intended as a substitute for professional medical care. Always follow your healthcare professional's instructions.        Reducing Knee Pain and Swelling    Many treatments can help reduce pain and swelling in your knee. Your healthcare provider or physical therapist may suggest one or more of the following treatments:  · Icing your knee helps reduce swelling. You may be asked to ice your knee once a day or more. Apply ice for about 15 to 20 minutes at a time, with at least 40 minutes between sessions. Always keep a towel between the ice and your skin.   · Keeping your leg raised above your heart helps excess fluid flow out of your knee joint. This reduces swelling.  · Compression means wrapping an elastic bandage or neoprene sleeve snugly around your knees. This keeps fluid from collecting in your knee joint.  · Electrical stimulation, done by a physical therapist or , can help reduce excess fluid in your knee joint.  · Anti-inflammatory medicines may be prescribed by your healthcare provider. You may take pills or receive injections in your knee.  · Isometric (luis) exercises strengthen the muscles that support your knee joint. They also help reduce excess fluid in your knee.  · Massage helps fluid drain away from your knee.  Date Last Reviewed: 10/13/2015  © 8054-4869 [employer]. [address] [place] [address], [place] [address]. All rights reserved. This information is not intended as a substitute for professional medical care. Always follow your healthcare professional's instructions.        Knee Pain  Knee pain is very common. Its especially common in active people who put a lot of pressure on their knees, like runners. It affects women more often than men.  Your kneecap (patella) is a thick, round bone. It covers and protects the front portion of your knee joint. It moves along a groove in your thighbone (femur) as part of the patellofemoral joint. A  layer of cartilage surrounds the underside of your kneecap. This layer protects it from grinding against your femur.  When this cartilage softens and breaks down, it can cause knee pain. This is partly because of repetitive stress. The stress irritates the lining of the joint. This causes pain in the underlying bone.  What causes knee pain?  Many things can cause knee pain. You may have more than one cause. Some of these include:  · Overuse of the knee joint  · The kneecap doesnt line up with the tissue around it  · Damage to small nerves in the area  · Damage to the ligament-like structure that holds the kneecap in place (retinaculum)  · Breakdown of the bone under the cartilage  · Swelling in the soft tissues around the kneecap  · Injury  You might be more likely to have knee pain if you:  · Exercise a lot  · Recently increased the intensity of your workouts  · Have a body mass index (BMI) greater than 25  · Have poor alignment of your kneecap  · Walk with your feet turned overly outward or inward  · Have weakness in surrounding muscle groups (inner quad or hip adductor muscles)  · Have too much tightness in surrounding muscle groups (hamstrings or iliotibial band)  · Have a recent history of injury to the area  · Are female  Symptoms of knee pain  This type of knee pain is a dull, aching pain in the front of the knee in the area under and around the kneecap. This pain may start quickly or slowly. Your pain might be worse when you squat, run, or sit for a long time. You might also sometimes feel like your knee is giving out. You may have symptoms in one or both of your knees.  Diagnosing knee pain  Your healthcare provider will ask about your medical history and your symptoms. Be sure to describe any activities that make your knee pain worse. He or she will look at your knee. This will include tests of your range of motion, strength, and areas of pain of your knee. Your knee alignment will be checked.  Your  healthcare provider will need to rule out other causes of your knee pain, such as arthritis. You may need an imaging test, such as an X-ray or MRI.  Treatment for knee pain  Treatments that can help ease your symptoms may include:  · Avoiding activities for a while that make your pain worse, returning to activity over time  · Icing the outside of your knee when it causes you pain  · Taking over-the-counter pain medicine  · Wearing a knee brace or taping your knee to support it  · Wearing special shoe inserts to help keep your feet in the proper alignment  · Doing special exercises to stretch and strengthen the muscles around your hip and your knee  These steps help most people manage knee pain. But some cases of knee pain need to be treated with surgery. You may need surgery right away. Or you may need it later if other treatments dont work. Your healthcare provider may refer you to an orthopedic surgeon. He or she will talk with you about your choices.  Preventing knee pain  Losing weight and correcting excess muscle tightness or muscle weakness may help lower your risk.  In some cases, you can prevent knee pain. To help prevent a flare-up of knee pain, you do these things:  · Regularly do all the exercises your doctor or physical therapist advises  · Support your knee as advised by your doctor or physical therapist  · Increase training gradually, and ease up on training when needed  · Have an expert check your gait for running or other sporting activities  · Stretch properly before and after exercise  · Replace your running shoes regularly  · Lose excess weight     When to call your healthcare provider  Call your healthcare provider right away if:  · Your symptoms dont get better after a few weeks of treatment  · You have any new symptoms   Date Last Reviewed: 4/1/2017  © 6725-7213 [employer]. [address] [place] [address], [place] [address]. All rights reserved. This information is not intended as a  substitute for professional medical care. Always follow your healthcare professional's instructions.

## 2020-02-17 ENCOUNTER — OFFICE VISIT (OUTPATIENT)
Dept: INTERNAL MEDICINE | Facility: CLINIC | Age: 35
End: 2020-02-17
Payer: COMMERCIAL

## 2020-02-17 VITALS
BODY MASS INDEX: 44.41 KG/M2 | SYSTOLIC BLOOD PRESSURE: 142 MMHG | DIASTOLIC BLOOD PRESSURE: 80 MMHG | OXYGEN SATURATION: 98 % | HEIGHT: 68 IN | HEART RATE: 95 BPM | WEIGHT: 293 LBS | TEMPERATURE: 99 F

## 2020-02-17 DIAGNOSIS — M54.41 ACUTE BILATERAL LOW BACK PAIN WITH RIGHT-SIDED SCIATICA: Primary | ICD-10-CM

## 2020-02-17 DIAGNOSIS — I11.0 HYPERTENSIVE HEART DISEASE WITH HEART FAILURE: ICD-10-CM

## 2020-02-17 DIAGNOSIS — E66.01 MORBID OBESITY WITH BMI OF 50.0-59.9, ADULT: ICD-10-CM

## 2020-02-17 DIAGNOSIS — I10 ESSENTIAL HYPERTENSION: ICD-10-CM

## 2020-02-17 PROCEDURE — 99214 OFFICE O/P EST MOD 30 MIN: CPT | Mod: 25,S$GLB,, | Performed by: PHYSICIAN ASSISTANT

## 2020-02-17 PROCEDURE — 3077F SYST BP >= 140 MM HG: CPT | Mod: CPTII,S$GLB,, | Performed by: PHYSICIAN ASSISTANT

## 2020-02-17 PROCEDURE — 96372 PR INJECTION,THERAP/PROPH/DIAG2ST, IM OR SUBCUT: ICD-10-PCS | Mod: S$GLB,,, | Performed by: PHYSICIAN ASSISTANT

## 2020-02-17 PROCEDURE — 3008F BODY MASS INDEX DOCD: CPT | Mod: CPTII,S$GLB,, | Performed by: PHYSICIAN ASSISTANT

## 2020-02-17 PROCEDURE — 3079F PR MOST RECENT DIASTOLIC BLOOD PRESSURE 80-89 MM HG: ICD-10-PCS | Mod: CPTII,S$GLB,, | Performed by: PHYSICIAN ASSISTANT

## 2020-02-17 PROCEDURE — 96372 THER/PROPH/DIAG INJ SC/IM: CPT | Mod: S$GLB,,, | Performed by: PHYSICIAN ASSISTANT

## 2020-02-17 PROCEDURE — 3077F PR MOST RECENT SYSTOLIC BLOOD PRESSURE >= 140 MM HG: ICD-10-PCS | Mod: CPTII,S$GLB,, | Performed by: PHYSICIAN ASSISTANT

## 2020-02-17 PROCEDURE — 3079F DIAST BP 80-89 MM HG: CPT | Mod: CPTII,S$GLB,, | Performed by: PHYSICIAN ASSISTANT

## 2020-02-17 PROCEDURE — 99214 PR OFFICE/OUTPT VISIT, EST, LEVL IV, 30-39 MIN: ICD-10-PCS | Mod: 25,S$GLB,, | Performed by: PHYSICIAN ASSISTANT

## 2020-02-17 PROCEDURE — 99999 PR PBB SHADOW E&M-EST. PATIENT-LVL IV: ICD-10-PCS | Mod: PBBFAC,,, | Performed by: PHYSICIAN ASSISTANT

## 2020-02-17 PROCEDURE — 99999 PR PBB SHADOW E&M-EST. PATIENT-LVL IV: CPT | Mod: PBBFAC,,, | Performed by: PHYSICIAN ASSISTANT

## 2020-02-17 PROCEDURE — 3008F PR BODY MASS INDEX (BMI) DOCUMENTED: ICD-10-PCS | Mod: CPTII,S$GLB,, | Performed by: PHYSICIAN ASSISTANT

## 2020-02-17 RX ORDER — METHOCARBAMOL 750 MG/1
500 TABLET, FILM COATED ORAL
COMMUNITY
End: 2020-02-17

## 2020-02-17 RX ORDER — KETOROLAC TROMETHAMINE 30 MG/ML
60 INJECTION, SOLUTION INTRAMUSCULAR; INTRAVENOUS
Status: COMPLETED | OUTPATIENT
Start: 2020-02-17 | End: 2020-02-17

## 2020-02-17 RX ORDER — MELOXICAM 7.5 MG/1
TABLET ORAL
Qty: 20 TABLET | Refills: 0 | Status: SHIPPED | OUTPATIENT
Start: 2020-02-17 | End: 2020-04-16

## 2020-02-17 RX ORDER — TIZANIDINE 2 MG/1
2 TABLET ORAL NIGHTLY PRN
Qty: 14 TABLET | Refills: 0 | Status: SHIPPED | OUTPATIENT
Start: 2020-02-17 | End: 2020-02-24

## 2020-02-17 RX ADMIN — KETOROLAC TROMETHAMINE 60 MG: 30 INJECTION, SOLUTION INTRAMUSCULAR; INTRAVENOUS at 01:02

## 2020-02-17 NOTE — LETTER
February 17, 2020    Carlene Panda  [address]  [address]  [place] [address]             [facility]  Internal Medicine  [address]  [place] [address]  Phone: [phone]  Fax: [phone]   February 17, 2020     Patient: Carlene Panda   YOB: 1985   Date of Visit: 2/17/2020       To Whom it May Concern:    Carlene Panda was seen in my clinic on 2/17/2020. She is excused for today and tomorrow 2/18/20 and can return on 2/19/20.    Please excuse her from any work missed.    If you have any questions or concerns, please don't hesitate to call.    Sincerely,         SHAREE Ballesteros/ ROSAURA Estevez LPN

## 2020-02-17 NOTE — PROGRESS NOTES
Subjective:      Patient ID: Carlene Panda is a 34 y.o. female.    Chief Complaint: Back Pain    Back Pain   This is a new problem. The current episode started in the past 7 days. The problem occurs constantly. The problem is unchanged. The pain is present in the lumbar spine and sacro-iliac. The quality of the pain is described as aching, shooting and stabbing. The pain radiates to the right thigh. The pain is at a severity of 9/10. The pain is severe. The pain is the same all the time. The symptoms are aggravated by bending, coughing, position, lying down, standing and twisting. Associated symptoms include leg pain. Pertinent negatives include no abdominal pain, bladder incontinence, bowel incontinence, chest pain, dysuria, fever, headaches, numbness, paresis, paresthesias, pelvic pain, perianal numbness, tingling, weakness or weight loss. Risk factors include lack of exercise, obesity and recent trauma. She has tried analgesics, heat and ice for the symptoms. The treatment provided mild relief.       Review of Systems   Constitutional: Negative for activity change, appetite change, chills, diaphoresis, fatigue, fever, unexpected weight change and weight loss.   HENT: Negative.  Negative for congestion, hearing loss, postnasal drip, rhinorrhea, sore throat, trouble swallowing and voice change.    Eyes: Negative.  Negative for visual disturbance.   Respiratory: Negative.  Negative for cough, choking, chest tightness and shortness of breath.    Cardiovascular: Negative for chest pain, palpitations and leg swelling.   Gastrointestinal: Negative for abdominal distention, abdominal pain, blood in stool, bowel incontinence, constipation, diarrhea, nausea and vomiting.   Endocrine: Negative for cold intolerance, heat intolerance, polydipsia and polyuria.   Genitourinary: Negative.  Negative for bladder incontinence, decreased urine volume, difficulty urinating, dysuria, frequency, genital sores, hematuria, menstrual  "problem, pelvic pain, urgency, vaginal bleeding, vaginal discharge and vaginal pain.   Musculoskeletal: Positive for arthralgias and back pain. Negative for gait problem, joint swelling, myalgias, neck pain and neck stiffness.   Skin: Negative for color change, pallor, rash and wound.   Neurological: Negative for dizziness, tingling, tremors, weakness, light-headedness, numbness, headaches and paresthesias.   Hematological: Negative for adenopathy.   Psychiatric/Behavioral: Negative for behavioral problems, confusion, self-injury, sleep disturbance and suicidal ideas. The patient is not nervous/anxious.      Objective:   BP (!) 142/80 (BP Location: Left arm, Patient Position: Sitting, BP Method: Large (Manual))   Pulse 95   Temp 98.9 °F (37.2 °C) (Tympanic)   Ht 5' 8" (1.727 m)   Wt (!) 167.6 kg (369 lb 7.9 oz)   LMP 01/27/2020   SpO2 98%   BMI 56.18 kg/m²     Physical Exam   Constitutional: She is oriented to person, place, and time. She appears well-developed and well-nourished. No distress.   HENT:   Head: Normocephalic and atraumatic.   Right Ear: External ear normal.   Left Ear: External ear normal.   Nose: Nose normal.   Mouth/Throat: Oropharynx is clear and moist.   Eyes: Pupils are equal, round, and reactive to light. Conjunctivae and EOM are normal.   Neck: Normal range of motion. Neck supple.   Cardiovascular: Normal rate, regular rhythm and normal heart sounds. Exam reveals no gallop and no friction rub.   No murmur heard.  Pulmonary/Chest: Effort normal and breath sounds normal. No respiratory distress. She has no wheezes. She has no rales. She exhibits no tenderness.   Abdominal: Soft. She exhibits no distension. There is no tenderness.   Musculoskeletal:        Right hip: Normal. She exhibits normal range of motion and normal strength.        Left hip: Normal. She exhibits normal range of motion and normal strength.        Lumbar back: She exhibits decreased range of motion, tenderness, pain and " spasm. She exhibits no bony tenderness, no swelling, no edema, no deformity, no laceration and normal pulse.   Lymphadenopathy:     She has no cervical adenopathy.   Neurological: She is alert and oriented to person, place, and time.   Skin: Skin is warm and dry. No rash noted. She is not diaphoretic.   Psychiatric: She has a normal mood and affect. Her behavior is normal. Judgment and thought content normal.   Vitals reviewed.      Assessment:     1. Acute bilateral low back pain with right-sided sciatica    2. Hypertensive heart disease with heart failure    3. Essential hypertension    4. Morbid obesity with BMI of 50.0-59.9, adult      Plan:   Acute bilateral low back pain with right-sided sciatica  -     tiZANidine (ZANAFLEX) 2 MG tablet; Take 1 tablet (2 mg total) by mouth nightly as needed (back pain).  Dispense: 14 tablet; Refill: 0  -     meloxicam (MOBIC) 7.5 MG tablet; Take 1-2 tablets by mouth once a day as needed for back pain  Dispense: 20 tablet; Refill: 0  -     ketorolac injection 60 mg    Hypertensive heart disease with heart failure  Essential hypertension  -a little elevated today. Pt in pain. Advised her to monitor at home. If consistently elevated above 140/90, needs to RTC    Morbid obesity with BMI of 50.0-59.9, adult    Educational handout on over-the-counter medications and at-home conservative care, pertinent to the patients diagnosis today, was handed to the patient and discussed in detail.  Advise to maintain lifestyle changes with low carbohydrate, low sugar diet and exercise 30 minutes daily      Follow up if symptoms worsen or fail to improve.

## 2020-02-17 NOTE — PATIENT INSTRUCTIONS
Back Care Tips    Caring for your back  These are things you can do to prevent a recurrence of acute back pain and to reduce symptoms from chronic back pain:  · Maintain a healthy weight. If you are overweight, losing weight will help most types of back pain.  · Exercise is an important part of recovery from most types of back pain. The muscles behind and in front of the spine support the back. This means strengthening both the back muscles and the abdominal muscles will provide better support for your spine.   · Swimming and brisk walking are good overall exercises to improve your fitness level.  · Practice safe lifting methods (below).  · Practice good posture when sitting, standing and walking. Avoid prolonged sitting. This puts more stress on the lower back than standing or walking.  · Wear quality shoes with sufficient arch support. Foot and ankle alignment can affect back symptoms. Women should avoid wearing high heels.  · Therapeutic massage can help relax the back muscles without stretching them.  · During the first 24 to 72 hours after an acute injury or flare-up of chronic back pain, apply an ice pack to the painful area for 20 minutes and then remove it for 20 minutes, over a period of 60 to 90 minutes, or several times a day. As a safety precaution, do not use a heating pad at bedtime. Sleeping on a heating pad can lead to skin burns or tissue damage.  · You can alternate ice and heat therapies.  Medications  Talk to your healthcare provider before using medicines, especially if you have other medical problems or are taking other medicines.  · You may use acetaminophen or ibuprofen to control pain, unless your healthcare provider prescribed other pain medicine. If you have chronic conditions like diabetes, liver or kidney disease, stomach ulcers, or gastrointestinal bleeding, or are taking blood thinners, talk with your healthcare provider before taking any medicines.  · Be careful if you are given  prescription pain medicines, narcotics, or medicine for muscle spasm. They can cause drowsiness, affect your coordination, reflexes, and judgment. Do not drive or operate heavy machinery while taking these types of medicines. Take prescription pain medicine only as prescribed by your healthcare provider.  Lumbar stretch  Here is a simple stretching exercise that will help relax muscle spasm and keep your back more limber. If exercise makes your back pain worse, dont do it.  · Lie on your back with your knees bent and both feet on the ground.  · Slowly raise your left knee to your chest as you flatten your lower back against the floor. Hold for 5 seconds.  · Relax and repeat the exercise with your right knee.  · Do 10 of these exercises for each leg.  Safe lifting method  · Dont bend over at the waist to lift an object off the floor.  Instead, bend your knees and hips in a squat.   · Keep your back and head upright  · Hold the object close to your body, directly in front of you.  · Straighten your legs to lift the object.   · Lower the object to the floor in the reverse fashion.  · If you must slide something across the floor, push it.  Posture tips  Sitting  Sit in chairs with straight backs or low-back support. Keep your knees lower than your hips, with your feet flat on the floor.  When driving, sit up straight. Adjust the seat forward so you are not leaning toward the steering wheel.  A small pillow or rolled towel behind your lower back may help if you are driving long distances.   Standing  When standing for long periods, shift most of your weight to one leg at a time. Alternate legs every few minutes.   Sleeping  The best way to sleep is on your side with your knees bent. Put a low pillow under your head to support your neck in a neutral spine position. Avoid thick pillows that bend your neck to one side. Put a pillow between your legs to further relax your lower back. If you sleep on your back, put pillows  under your knees to support your legs in a slightly flexed position. Use a firm mattress. If your mattress sags, replace it, or use a 1/2-inch plywood board under the mattress to add support.  Follow-up care  Follow up with your healthcare provider, or as advised.  If X-rays, a CT scan or an MRI scan were taken, they will be reviewed by a radiologist. You will be notified of any new findings that may affect your care.  Call 911  Seek emergency medical care if any of the following occur:  · Trouble breathing  · Confusion  · Very drowsy  · Fainting or loss of consciousness  · Rapid or very slow heart rate  · Loss of  bowel or bladder control  When to seek medical care  Call your healthcare provider if any of the following occur:  · Pain becomes worse or spreads to your arms or legs  · Weakness or numbness in one or both arms or legs  · Numbness in the groin area  Date Last Reviewed: 6/1/2016  © 4033-3933 [employer]. [address] [place] [address]. All rights reserved. This information is not intended as a substitute for professional medical care. Always follow your healthcare professional's instructions.        Exercises to Strengthen Your Lower Back  Strong lower back and abdominal muscles work together to support your spine. The exercises below will help strengthen the lower back. It is important that you begin exercising slowly and increase levels gradually.  Always begin any exercise program with stretching. If you feel pain while doing any of these exercises, stop and talk to your doctor about a more specific exercise program that better suits your condition.   Low back stretch  The point of stretching is to make you more flexible and increase your range of motion. Stretch only as much as you are able. Stretch slowly. Do not push your stretch to the limit. If at any point you feel pain while stretching, this is your (temporary) limit.  · Lie on your back with your knees bent and both  feet on the ground.  · Slowly raise your left knee to your chest as you flatten your lower back against the floor. Hold for 5 seconds.  · Relax and repeat the exercise with your right knee.  · Do 10 of these exercises for each leg.  · Repeat hugging both knees to your chest at the same time.  Building lower back strength  Start your exercise routine with 10 to 30 minutes a day, 1 to 3 times a day.  Initial exercises  Lying on your back:  1. Ankle pumps: Move your foot up and down, towards your head, and then away. Repeat 10 times with each foot.  2. Heel slides: Slowly bend your knee, drawing the heel of your foot towards you. Then slide your heel/foot from you, straightening your knee. Do not lift your foot off the floor (this is not a leg lift).  3. Abdominal contraction: Bend your knees and put your hands on your stomach. Tighten your stomach muscles. Hold for 5 seconds, then relax. Repeat 10 times.  4. Straight leg raise: Bend one leg at the knee and keep the other leg straight. Tighten your stomach muscles. Slowly lift your straight leg 6 to 12 inches off the floor and hold for up to 5 seconds. Repeat 10 times on each side.  Standin. Wall squats: Stand with your back against the wall. Move your feet about 12 inches away from the wall. Tighten your stomach muscles, and slowly bend your knees until they are at about a 45 degree angle. Do not go down too far. Hold about 5 seconds. Then slowly return to your starting position. Repeat 10 times.  2. Heel raises: Stand facing the wall. Slowly raise the heels of your feet up and down, while keeping your toes on the floor. If you have trouble balancing, you can touch the wall with your hands. Repeat 10 times.  More advanced exercises  When you feel comfortable enough, try these exercises.  1. Kneeling lumbar extension: Begin on your hands and knees. At the same time, raise and straighten your right arm and left leg until they are parallel to the ground. Hold for 2  seconds and come back slowly to a starting position. Repeat with left arm and right leg, alternating 10 times.  2. Prone lumbar extension: Lie face down, arms extended overhead, palms on the floor. At the same time, raise your right arm and left leg as high as comfortably possible. Hold for 10 seconds and slowly return to start. Repeat with left arm and right leg, alternating 10 times. Gradually build up to 20 times. (Advanced: Repeat this exercise raising both arms and both legs a few inches off the floor at the same time. Hold for 5 seconds and release.)  3. Pelvic tilt: Lie on the floor on your back with your knees bent at 90 degrees. Your feet should be flat on the floor. Inhale, exhale, then slowly contract your abdominal muscles bringing your navel toward your spine. Let your pelvis rock back until your lower back is flat on the floor. Hold for 10 seconds while breathing smoothly.  4. Abdominal crunch: Perform a pelvic tilt (above) flattening your lower back against the floor. Holding the tension in your abdominal muscles, take another breath and raise your shoulder blades off the ground (this is not a full sit-up). Keep your head in line with your body (dont bend your neck forward). Hold for 2 seconds, then slowly lower.  Date Last Reviewed: 6/1/2016  © 4935-7499 [employer]. [address] [place] [address], [place] [id number]. All rights reserved. This information is not intended as a substitute for professional medical care. Always follow your healthcare professional's instructions.        General Neck and Back Pain    Both neck and back pain are usually caused by injury to the muscles or ligaments of the spine. Sometimes the disks that separate each bone of the spine may cause pain by pressing on a nearby nerve. Back and neck pain may appear after a sudden twisting or bending force (such as in a car accident), or sometimes after a simple awkward movement. In either case, muscle spasm is often present  and adds to the pain.  Acute neck and back pain usually gets better in 1 to 2 weeks. Pain related to disk disease, arthritis in the spinal joints or spinal stenosis (narrowing of the spinal canal) can become chronic and last for months or years.  Back and neck pain are common problems. Most people feel better in 1 or 2 weeks, and most of the rest in 1 to 2 months. Most people can remain active.  People experience and describe pain differently.  · Pain can be sharp, stabbing, shooting, aching, cramping, or burning  · Movement, standing, bending, lifting, sitting, or walking may worsen the pain  · Pain can be localized to one spot or area, or it can be more generalized  · Pain can spread or radiate upwards, downwards, to the front, or go down your arms  · Muscle spasm may occur.  Most of the time mechanical problems with the muscles or spine cause the pain. it is usually caused by an injury, whether known or not, to the muscles or ligaments. While illnesses can cause back pain, it is usually not caused by a serious illness. Pain is usually related to physical activity, whether sports, exercise, work, or normal activity. Sometimes it can occur without an identifiable cause. This can happen simply by stretching or moving wrong, without noting pain at the time. Other causes include:  · Overexertion, lifting, pushing, pulling incorrectly or too aggressively.  · Sudden twisting, bending or stretching from an accident (car or fall), or accidental movement.  · Poor posture  · Poor conditioning, lack of regular exercise  · Spinal disc disease or arthritis  · Stress  · Pregnancy, or illness like appendicitis, bladder or kidney infection, pelvic infections   Home care  · For neck pain: Use a comfortable pillow that supports the head and keeps the spine in a neutral position. The position of the head should not be tilted forward or backward.  · When in bed, try to find a position of comfort. A firm mattress is best. Try lying  flat on your back with pillows under your knees. You can also try lying on your side with your knees bent up towards your chest and a pillow between your knees.  · At first, do not try to stretch out the sore spots. If there is a strain, it is not like the good soreness you get after exercising without an injury. In this case, stretching may make it worse.  · Avoid prolonged sitting, long car rides or travel. This puts more stress on the lower back than standing or walking.  · During the first 24 to 72 hours after an injury, apply an ice pack to the painful area for 20 minutes and then remove it for 20 minutes over a period of 60 to 90 minutes or several times a day.   · You can alternate ice and heat therapies. Talk with your healthcare provider about the best treatment for your back or neck pain. As a safety precaution, do not use a heating pad at bedtime. Sleeping with a heating pad can lead to skin burns or tissue damage.  · Therapeutic massage can help relax the back and neck muscles without stretching them.  · Be aware of safe lifting methods and do not lift anything over 15 pounds until all the pain is gone.  Medications  Talk to your healthcare provider before using medicine, especially if you have other medical problems or are taking other medicines.  · You may use over-the-counter medicine to control pain, unless another pain medicine was prescribed. If you have chronic conditions like diabetes, liver or kidney disease, stomach ulcers,  gastrointestinal bleeding, or are taking blood thinner medicines.  · Be careful if you are given pain medicines, narcotics, or medicine for muscle spasm. They can cause drowsiness, and can affect your coordination, reflexes, and judgment. Do not drive or operate heavy machinery.  Follow-up care  Follow up with your healthcare provider, or as advised. Physical therapy or further tests may be needed.  If X-rays were taken, you will be notified of any new findings that may  affect your care.  Call 911  Seek emergency medical care if any of the following occur:  · Trouble breathing  · Confusion  · Very drowsy or trouble awakening  · Fainting or loss of consciousness  · Rapid or very slow heart rate  · Loss of bowel or bladder control  When to seek medical advice  Call your healthcare provider right away if any of these occur:  · Pain becomes worse or spreads into your arms or legs  · Weakness, numbness or pain in one or both arms or legs  · Numbness in the groin area  · Difficulty walking  · Fever of 100.4ºF (38ºC) or higher, or as directed by your healthcare provider  Date Last Reviewed: 7/1/2016 © 2000-2017 [employer]. [address] [place] [address], [place] [address]. All rights reserved. This information is not intended as a substitute for professional medical care. Always follow your healthcare professional's instructions.        Self-Care for Low Back Pain    Most people have low back pain now and then. In many cases, it isnt serious and self-care can help. Sometimes low back pain can be a sign of a bigger problem. Call your healthcare provider if your pain returns often or gets worse over time. For the long-term care of your back, get regular exercise, lose any excess weight and learn good posture.  Take a short rest  Lying down during the day may be beneficial for short periods of time if severe pain increases with sitting or standing. Long-term bed rest could be detrimental.  Reduce pain and swelling  Cold reduces swelling. Both cold and heat can reduce pain. Protect your skin by placing a towel between your body and the ice or heat source.  · For the first few days, apply an ice pack for 15 to 20 minutes .  · After the first few days, try heat for 15 minutes at a time to ease pain. Never sleep on a heating pad.  · Over-the-counter medicine can help control pain and swelling. Try aspirin or ibuprofen.  Exercise  Exercise can help your back heal. It also helps your back  get stronger and more flexible, preventing any reinjury. Ask your healthcare provider about specific exercises for your back.  Use good posture to avoid reinjury  · When moving, bend at the hips and knees. Dont bend at the waist or twist around.  · When lifting, keep the object close to your body. Dont try to lift more than you can handle.  · When sitting, keep your lower back supported. Use a rolled-up towel as needed.  Seek immediate medical care if:  · Youre unable to stand or walk.  · You have a temperature over 100.4°F (38.0°C)  · You have frequent, painful, or bloody urination.  · You have severe abdominal pain.  · You have a sharp, stabbing pain.  · Your pain is constant.  · You have pain or numbness in your leg.  · You feel pain in a new area of your back.  · You notice that the pain isnt decreasing after more than a week.   Date Last Reviewed: 9/29/2015  © 8539-9293 [employer] [address] [place] [address]. All rights reserved. This information is not intended as a substitute for professional medical care. Always follow your healthcare professional's instructions.        Relieving Back Pain  Back pain is a common problem. You can strain back muscles by lifting too much weight or just by moving the wrong way. Back strain can be uncomfortable, even painful. And it can take weeks or months to improve. To help yourself feel better and prevent future back strains, try these tips.  Important Note: Do not give aspirin to children or teens without first discussing it with your healthcare provider.      ? Ice    Ice reduces muscle pain and swelling. It helps most during the first 24 to 48 hours after an injury.  · Wrap an ice pack or a bag of frozen peas in a thin towel. (Never place ice directly on your skin.)  · Place the ice where your back hurts the most.  · Dont ice for more than 20 minutes at a time.  · You can use ice several times a day.  ? Medicines  Over-the-counter pain  relievers can include acetaminophen and anti-inflammatory medicines, which includes aspirin or ibuprofen. They can help ease discomfort. Some also reduce swelling.  · Tell your healthcare provider about any medicines you are already taking.  · Take medicines only as directed.  ? Heat  After the first 48 hours, heat can relax sore muscles and improve blood flow.  · Try a warm bath or shower. Or use a heating pad set on low. To prevent a burn, keep a cloth between you and the heating pad.  · Dont use a heating pad for more than 15 minutes at a time. Never sleep on a heating pad.  Date Last Reviewed: 9/1/2015  © 6005-0643 [employer]. [address] [place] [address], [place] [address]. All rights reserved. This information is not intended as a substitute for professional medical care. Always follow your healthcare professional's instructions.        Causes of Lumbar (Low Back) Pain  Low back pain can be caused by problems with any part of the lumbar spine. A disk can herniate (push out) and press on a nerve. Vertebrae can rub against each other or slip out of place. This can irritate facet joints and nerves. It can also lead to stenosis, a narrowing of the spinal canal or foramen.  Pressure from a disk  Constant wear and tear on a disk can cause it to weaken and push outward. Part of the disk may then press on nearby nerves. There are two common types of herniated disks:  Contained means the soft nucleus is protruding outward.   Extruded means the firm annulus has torn, letting the soft center squeeze through.     Pressure from bone  An unstable spine   With age, a disk may thin and wear out. Vertebrae above and below the disk may begin to touch. This can put pressure on nerves. It can also cause bone spurs (growths) to form where the bones rub together.    Stenosis results when bone spurs narrow the foramen or spinal canal. This also puts pressure on nerves. Slipping vertebrae can irritate nerves and joints.  They can also worsen stenosis.    In some cases, vertebrae become unstable and slip forward. This is called spondylolisthesis.     Date Last Reviewed: 10/12/2015  © 1806-7326 [employer]. [address] [place] [address], [place] [address]. All rights reserved. This information is not intended as a substitute for professional medical care. Always follow your healthcare professional's instructions.        Caring for Your Back Throughout the Day  Take care of your back throughout the day. You will likely have fewer back problems if you do. Try to warm up before you move. Shift positions often. Also do your best to form healthy habits.    Warm up for the day  Do a few slow, catlike stretches before starting your day. This simple warmup can soften your disks, stretch your back muscles, and help prevent injuries.  Shift positions often  At work and at home, change positions often. This helps keep your body from getting stiff. Stand up or lean back while you sit. If you can, get up and move every 1/2 hour.  Form healthy habits  Here are some suggestions:   · Keep a healthy weight. When you weigh too much, your back is under excess strain. But losing just a few extra pounds can help a lot.  · Try not to overeat. Learn about serving sizes. The size of a serving depends on the food and the food group. Many foods list serving sizes on the labels.  · Handle minor aches with cold and heat. Apply cold the first 24 to 48 hours. Use heat after that. Always place a thin cloth between your skin and the source of cold or heat.  · Take medicines as directed. This helps keep pain under control. Always read labels, and call your healthcare provider or pharmacist if you have any questions.  Walk each day  A daily walk keeps your back and thigh muscles stretched and strong. This gives your back better support. Be sure to walk with your spines three curves aligned, by keeping your head, hips, and toes connected by a vertical line.    Date Last Reviewed: 10/18/2015  © 4499-6882 [employer]. [address] [place] [address], [place] [address]. All rights reserved. This information is not intended as a substitute for professional medical care. Always follow your healthcare professional's instructions.

## 2020-02-17 NOTE — LETTER
February 17, 2020      [facility]  [address]  [place] [address]  Phone: [phone]  Fax: [phone]       Patient: Carlene Panda   YOB: 1985  Date of Visit: 02/17/2020    To Whom It May Concern:    Citlali Panda  was at [facility] on 02/17/2020. She may return to work/school on 2/18/2020 with no restrictions. If you have any questions or concerns, or if I can be of further assistance, please do not hesitate to contact me.    Sincerely,          Carola Ballesteros PA-C

## 2020-04-15 ENCOUNTER — PATIENT MESSAGE (OUTPATIENT)
Dept: INTERNAL MEDICINE | Facility: CLINIC | Age: 35
End: 2020-04-15

## 2020-04-16 ENCOUNTER — OFFICE VISIT (OUTPATIENT)
Dept: INTERNAL MEDICINE | Facility: CLINIC | Age: 35
End: 2020-04-16
Payer: COMMERCIAL

## 2020-04-16 DIAGNOSIS — M25.512 ACUTE PAIN OF BOTH SHOULDERS: ICD-10-CM

## 2020-04-16 DIAGNOSIS — M25.511 ACUTE PAIN OF BOTH SHOULDERS: ICD-10-CM

## 2020-04-16 DIAGNOSIS — M62.838 NECK MUSCLE SPASM: ICD-10-CM

## 2020-04-16 DIAGNOSIS — M54.2 NECK PAIN: Primary | ICD-10-CM

## 2020-04-16 PROCEDURE — 99214 PR OFFICE/OUTPT VISIT, EST, LEVL IV, 30-39 MIN: ICD-10-PCS | Mod: GT,,, | Performed by: NURSE PRACTITIONER

## 2020-04-16 PROCEDURE — 99214 OFFICE O/P EST MOD 30 MIN: CPT | Mod: GT,,, | Performed by: NURSE PRACTITIONER

## 2020-04-16 RX ORDER — CYCLOBENZAPRINE HCL 10 MG
10 TABLET ORAL NIGHTLY PRN
Qty: 5 TABLET | Refills: 0 | Status: SHIPPED | OUTPATIENT
Start: 2020-04-16 | End: 2020-04-26

## 2020-04-16 RX ORDER — KETOROLAC TROMETHAMINE 10 MG/1
10 TABLET, FILM COATED ORAL EVERY 8 HOURS PRN
Qty: 15 TABLET | Refills: 0 | Status: SHIPPED | OUTPATIENT
Start: 2020-04-16

## 2020-04-16 NOTE — PROGRESS NOTES
TELEMEDICINE VIRTUAL VISIT      Visit Details: This visit was a telemedicine virtual visit with synchronous audio and video. Carlene reported that her location at the time of this visit was in the state [facility]. Carlene had the choice to come into office to receive these medical services. Carlene chose and consented to receive these medical services by telemedicine.  Subjective:       Patient ID: Carlene Panda is a 34 y.o. female.    Chief Complaint: neck and shoulder pain  HPI    Pt reports neck/shoulder pain x 4 days. No injury. Rates pain 7/10. Taking tylenol, using ice/heat, and icy hot. Minimal relief. Neck feels tight. No HA, visual disturbance, n/v, sinus issues, dizziness, weakness/numbness/tingling in upper extremities, or any other acute issues.          Review of Systems   Constitutional: Negative for activity change, appetite change, chills, diaphoresis, fatigue, fever and unexpected weight change.   HENT: Negative for congestion, ear pain, postnasal drip, rhinorrhea, sinus pressure, sinus pain, sneezing, sore throat, tinnitus, trouble swallowing and voice change.    Eyes: Negative for photophobia, pain and visual disturbance.   Respiratory: Negative for cough, chest tightness, shortness of breath and wheezing.    Cardiovascular: Negative for chest pain, palpitations and leg swelling.   Gastrointestinal: Negative for abdominal distention, abdominal pain, constipation, diarrhea, nausea and vomiting.   Genitourinary: Negative for decreased urine volume, difficulty urinating, dysuria, flank pain, frequency, hematuria and urgency.   Musculoskeletal: Positive for myalgias, neck pain and neck stiffness. Negative for arthralgias, back pain and joint swelling.   Allergic/Immunologic: Negative for immunocompromised state.   Neurological: Negative for dizziness, tremors, seizures, syncope, facial asymmetry, speech difficulty, weakness, light-headedness, numbness and headaches.   Hematological: Negative for  adenopathy. Does not bruise/bleed easily.   Psychiatric/Behavioral: Negative for confusion and sleep disturbance.       Objective:      Physical Exam   Neck: Decreased range of motion present.   Cardiovascular:   Radial pulse palpated by pt per instruction         CONSTITUTIONAL: No apparent distress. Does not appear acutely ill or septic. Appears adequately hydrated.  PULM: Breathing unlabored.  PSYCHIATRIC: Alert and conversant and grossly oriented. Mood is grossly neutral. Affect appropriate. Judgment and insight grossly intact.    Assessment:   There were no vitals filed for this visit.      1. Neck pain    2. Acute pain of both shoulders    3. Neck muscle spasm        Plan:   Neck pain    Acute pain of both shoulders    Neck muscle spasm    Other orders  -     cyclobenzaprine (FLEXERIL) 10 MG tablet; Take 1 tablet (10 mg total) by mouth nightly as needed for Muscle spasms (caution for sedation).  Dispense: 5 tablet; Refill: 0  -     ketorolac (TORADOL) 10 mg tablet; Take 1 tablet (10 mg total) by mouth every 8 (eight) hours as needed for Pain.  Dispense: 15 tablet; Refill: 0        As above  Continue heat/ice  meds and SE reviewed  F/u with PCP if no improvement.

## 2020-05-04 DIAGNOSIS — E78.2 MIXED HYPERLIPIDEMIA: ICD-10-CM

## 2020-05-04 RX ORDER — ATORVASTATIN CALCIUM 20 MG/1
TABLET, FILM COATED ORAL
Qty: 90 TABLET | Refills: 0 | Status: SHIPPED | OUTPATIENT
Start: 2020-05-04 | End: 2020-12-21

## 2020-09-28 ENCOUNTER — PATIENT MESSAGE (OUTPATIENT)
Dept: INTERNAL MEDICINE | Facility: CLINIC | Age: 35
End: 2020-09-28

## 2020-12-21 DIAGNOSIS — E78.2 MIXED HYPERLIPIDEMIA: ICD-10-CM

## 2020-12-21 RX ORDER — ATORVASTATIN CALCIUM 20 MG/1
TABLET, FILM COATED ORAL
Qty: 30 TABLET | Refills: 0 | Status: SHIPPED | OUTPATIENT
Start: 2020-12-21 | End: 2021-02-09

## 2020-12-21 NOTE — TELEPHONE ENCOUNTER
Spoke to pt about refill on atorvastatin explain to pt she will need a f/u appt for further refills pt states she will make that appt on her own verbalized understanding

## 2022-03-15 ENCOUNTER — PATIENT OUTREACH (OUTPATIENT)
Dept: ADMINISTRATIVE | Facility: HOSPITAL | Age: 37
End: 2022-03-15
Payer: COMMERCIAL

## 2022-03-15 NOTE — PROGRESS NOTES
BR PAP REPORT: Chart was reviewed for overdue pap smear. Called and spoke with pt offered to schedule pap smear pt will callback her cars in the shop at the moment.

## 2022-04-11 NOTE — TELEPHONE ENCOUNTER
Returned call to pt in regards to trying to schedule an appt. No answer left message to call back office. HUBER   Abdomen soft, non-tender and non-distended, no rebound, no guarding and no masses. no hepatosplenomegaly.

## 2022-04-27 ENCOUNTER — PATIENT MESSAGE (OUTPATIENT)
Dept: ADMINISTRATIVE | Facility: HOSPITAL | Age: 37
End: 2022-04-27
Payer: COMMERCIAL

## 2022-07-27 ENCOUNTER — PATIENT MESSAGE (OUTPATIENT)
Dept: ADMINISTRATIVE | Facility: HOSPITAL | Age: 37
End: 2022-07-27
Payer: COMMERCIAL

## 2022-10-20 ENCOUNTER — PATIENT MESSAGE (OUTPATIENT)
Dept: ADMINISTRATIVE | Facility: HOSPITAL | Age: 37
End: 2022-10-20
Payer: COMMERCIAL

## 2022-10-28 ENCOUNTER — PATIENT OUTREACH (OUTPATIENT)
Dept: ADMINISTRATIVE | Facility: HOSPITAL | Age: 37
End: 2022-10-28
Payer: COMMERCIAL

## 2022-11-10 ENCOUNTER — PATIENT OUTREACH (OUTPATIENT)
Dept: ADMINISTRATIVE | Facility: HOSPITAL | Age: 37
End: 2022-11-10
Payer: COMMERCIAL

## 2023-07-19 NOTE — PROGRESS NOTES
Bela Yee was notified  of negative  carrier screening results from [facility].   These results significantly reduces the likelihood of her being a carrier of conditions tested for and residual risk was reviewed.  No additional screening is suggested for FOB.        Results to be scanned into EPIC.       CHIEF COMPLAINT  Pain (Buttocks)      HISTORY OF PRESENT ILLNESS      Problem List Items Addressed This Visit        Cardiac/Vascular    Dilated cardiomyopathy (Chronic)    Overview     CARDIOLOGIST: Ella Deshpande MD          Current Assessment & Plan     This condition appears to be STABLE and COMPENSATED.            Endocrine    Morbid obesity with BMI of 50.0-59.9, adult    Current Assessment & Plan     Wt Readings from Last 5 Encounters:   01/29/19 (!) 163.4 kg (360 lb 3.7 oz)   12/20/18 (!) 164.4 kg (362 lb 7 oz)   08/10/18 (!) 161.2 kg (355 lb 6.8 oz)   06/05/18 (!) 161.3 kg (355 lb 9.6 oz)   11/28/17 (!) 160.6 kg (354 lb 0.9 oz)   This condition appears to be STABLE. She is working on therapeutic lifestyle changes.            Orthopedic    Chronic buttock pain - Primary    Current Assessment & Plan     Primary complaint is pain of buttocks.  Onset greater than a year ago.  Location is bilateral ischia.  Severity described as moderate at worst.  Timing described as occurring primarily when rising from sit to stand, and lasting only several seconds, and then resolving.  Exacerbating factors include prolonged sitting prior to rising and standing.  Alleviating factors have included over-the-counter Tylenol.  We discussed differential diagnosis and treatment options.         Plantar fasciitis of left foot (Chronic)    Current Assessment & Plan     She reports pain on the plantar aspect of her left heel, with the worst pain being the first steps out of bed in the morning.  She says that she was seen by a podiatrist, diagnosed with plantar fasciitis, and given a Medrol Dosepak, which provided relief, but she did not tolerate the steroids due to side effect of nervousness, so she stopped taking them after only a couple of doses.  She resumed taking them this morning because the plantar fasciitis pain was worsening.  She reports no recent relevant trauma.  We discussed treatment options.         Relevant Orders     "SPLINT FOR HOME USE       Other    Hamstring strain, initial encounter (Chronic)    Overview     BILAT         Relevant Orders    Ambulatory Referral to Physical/Occupational Therapy            REVIEW OF SYSTEMS  CONSTITUTIONAL: No fever or chills reported.   GASTROINTESTINAL: No hematemesis or melena reported.  GENITOURINARY: No dysuria or hematuria reported.     PHYSICAL EXAM  Vitals:    01/29/19 1307   BP: 130/84   BP Location: Right arm   Patient Position: Sitting   BP Method: Large (Manual)   Pulse: 86   Temp: 98.8 °F (37.1 °C)   TempSrc: Tympanic   SpO2: 99%   Weight: (!) 163.4 kg (360 lb 3.7 oz)   Height: 5' 8" (1.727 m)     CONSTITUTIONAL: Vital signs noted. No apparent distress. Does not appear acutely ill or septic. Appears adequately hydrated.  PULM: Breathing unlabored.  PSYCHIATRIC: Alert and conversant and grossly oriented. Mood is grossly neutral. Affect appropriate. Judgment and insight not grossly compromised.  MUSCULOSKELETAL: Grossly normal stance and gait. Palpation of ischial prominences bilaterally reproduce pain to a mild degree.    Follow-up if symptoms worsen or fail to improve.    Documentation entered by me for this encounter may have been done in part using speech-recognition technology. Although I have made an effort to ensure accuracy, "sound like" errors may exist and should be interpreted in context. -LESIA Palacio MD      There are no Patient Instructions on file for this visit.   "

## 2023-08-17 ENCOUNTER — PATIENT MESSAGE (OUTPATIENT)
Dept: ADMINISTRATIVE | Facility: OTHER | Age: 38
End: 2023-08-17
Payer: COMMERCIAL